# Patient Record
Sex: MALE | Race: WHITE | NOT HISPANIC OR LATINO | Employment: FULL TIME | URBAN - METROPOLITAN AREA
[De-identification: names, ages, dates, MRNs, and addresses within clinical notes are randomized per-mention and may not be internally consistent; named-entity substitution may affect disease eponyms.]

---

## 2017-01-30 ENCOUNTER — HISTORICAL (OUTPATIENT)
Dept: ADMINISTRATIVE | Facility: HOSPITAL | Age: 52
End: 2017-01-30

## 2017-07-07 LAB — CRC RECOMMENDATION EXT: NORMAL

## 2018-07-09 ENCOUNTER — HISTORICAL (OUTPATIENT)
Dept: ADMINISTRATIVE | Facility: HOSPITAL | Age: 53
End: 2018-07-09

## 2021-08-02 ENCOUNTER — HISTORICAL (OUTPATIENT)
Dept: ADMINISTRATIVE | Facility: HOSPITAL | Age: 56
End: 2021-08-02

## 2021-08-02 LAB
ABS NEUT (OLG): 3.65 X10(3)/MCL (ref 2.1–9.2)
BASOPHILS # BLD AUTO: 0 X10(3)/MCL (ref 0–0.2)
BASOPHILS NFR BLD AUTO: 0 %
CRP SERPL HS-MCNC: 1.59 MG/DL
EOSINOPHIL # BLD AUTO: 0.1 X10(3)/MCL (ref 0–0.9)
EOSINOPHIL NFR BLD AUTO: 2 %
ERYTHROCYTE [DISTWIDTH] IN BLOOD BY AUTOMATED COUNT: 13 % (ref 11.5–17)
ERYTHROCYTE [SEDIMENTATION RATE] IN BLOOD: 4 MM/HR (ref 0–15)
HCT VFR BLD AUTO: 44.7 % (ref 42–52)
HGB BLD-MCNC: 13.9 GM/DL (ref 14–18)
LYMPHOCYTES # BLD AUTO: 1.6 X10(3)/MCL (ref 0.6–4.6)
LYMPHOCYTES NFR BLD AUTO: 28 %
MCH RBC QN AUTO: 28 PG (ref 27–31)
MCHC RBC AUTO-ENTMCNC: 31.1 GM/DL (ref 33–36)
MCV RBC AUTO: 90.1 FL (ref 80–94)
MONOCYTES # BLD AUTO: 0.5 X10(3)/MCL (ref 0.1–1.3)
MONOCYTES NFR BLD AUTO: 8 %
NEUTROPHILS # BLD AUTO: 3.65 X10(3)/MCL (ref 2.1–9.2)
NEUTROPHILS NFR BLD AUTO: 61 %
PLATELET # BLD AUTO: 267 X10(3)/MCL (ref 130–400)
PMV BLD AUTO: 10.7 FL (ref 9.4–12.4)
RBC # BLD AUTO: 4.96 X10(6)/MCL (ref 4.7–6.1)
WBC # SPEC AUTO: 6 X10(3)/MCL (ref 4.5–11.5)

## 2021-10-28 ENCOUNTER — HISTORICAL (OUTPATIENT)
Dept: ADMINISTRATIVE | Facility: HOSPITAL | Age: 56
End: 2021-10-28

## 2021-10-28 LAB
ABS NEUT (OLG): 2.83 X10(3)/MCL (ref 2.1–9.2)
ALBUMIN SERPL-MCNC: 3.8 GM/DL (ref 3.5–5)
ALBUMIN/GLOB SERPL: 1.4 RATIO (ref 1.1–2)
ALP SERPL-CCNC: 98 UNIT/L (ref 40–150)
ALT SERPL-CCNC: 22 UNIT/L (ref 0–55)
APPEARANCE, UA: NORMAL
AST SERPL-CCNC: 17 UNIT/L (ref 5–34)
BACTERIA SPEC CULT: NORMAL /HPF
BASOPHILS # BLD AUTO: 0 X10(3)/MCL (ref 0–0.2)
BASOPHILS NFR BLD AUTO: 1 %
BILIRUB SERPL-MCNC: 0.4 MG/DL
BILIRUB UR QL STRIP: NEGATIVE
BILIRUBIN DIRECT+TOT PNL SERPL-MCNC: 0.2 MG/DL (ref 0–0.5)
BILIRUBIN DIRECT+TOT PNL SERPL-MCNC: 0.2 MG/DL (ref 0–0.8)
BUN SERPL-MCNC: 18.2 MG/DL (ref 8.4–25.7)
CALCIUM SERPL-MCNC: 9.1 MG/DL (ref 8.7–10.5)
CHLORIDE SERPL-SCNC: 103 MMOL/L (ref 98–107)
CHOLEST SERPL-MCNC: 158 MG/DL
CHOLEST/HDLC SERPL: 3 {RATIO} (ref 0–5)
CO2 SERPL-SCNC: 28 MMOL/L (ref 22–29)
COLOR UR: YELLOW
CREAT SERPL-MCNC: 0.75 MG/DL (ref 0.73–1.18)
EOSINOPHIL # BLD AUTO: 0.1 X10(3)/MCL (ref 0–0.9)
EOSINOPHIL NFR BLD AUTO: 3 %
ERYTHROCYTE [DISTWIDTH] IN BLOOD BY AUTOMATED COUNT: 14.1 % (ref 11.5–17)
GLOBULIN SER-MCNC: 2.8 GM/DL (ref 2.4–3.5)
GLUCOSE (UA): NEGATIVE
GLUCOSE SERPL-MCNC: 79 MG/DL (ref 74–100)
HCT VFR BLD AUTO: 43.4 % (ref 42–52)
HDLC SERPL-MCNC: 56 MG/DL (ref 35–60)
HGB BLD-MCNC: 13.6 GM/DL (ref 14–18)
HGB UR QL STRIP: NEGATIVE
KETONES UR QL STRIP: NEGATIVE
LDLC SERPL CALC-MCNC: 94 MG/DL (ref 50–140)
LEUKOCYTE ESTERASE UR QL STRIP: NEGATIVE
LYMPHOCYTES # BLD AUTO: 1.2 X10(3)/MCL (ref 0.6–4.6)
LYMPHOCYTES NFR BLD AUTO: 25 %
MCH RBC QN AUTO: 27.4 PG (ref 27–31)
MCHC RBC AUTO-ENTMCNC: 31.3 GM/DL (ref 33–36)
MCV RBC AUTO: 87.5 FL (ref 80–94)
MONOCYTES # BLD AUTO: 0.5 X10(3)/MCL (ref 0.1–1.3)
MONOCYTES NFR BLD AUTO: 10 %
NEUTROPHILS # BLD AUTO: 2.83 X10(3)/MCL (ref 2.1–9.2)
NEUTROPHILS NFR BLD AUTO: 61 %
NITRITE UR QL STRIP: NEGATIVE
PH UR STRIP: 7.5 [PH] (ref 5–9)
PLATELET # BLD AUTO: 236 X10(3)/MCL (ref 130–400)
PMV BLD AUTO: 10.1 FL (ref 9.4–12.4)
POTASSIUM SERPL-SCNC: 4.6 MMOL/L (ref 3.5–5.1)
PROT SERPL-MCNC: 6.6 GM/DL (ref 6.4–8.3)
PROT UR QL STRIP: NEGATIVE
PSA SERPL-MCNC: 1.57 NG/ML
RBC # BLD AUTO: 4.96 X10(6)/MCL (ref 4.7–6.1)
RBC #/AREA URNS HPF: NORMAL /[HPF]
SODIUM SERPL-SCNC: 140 MMOL/L (ref 136–145)
SP GR UR STRIP: 1.02 (ref 1–1.03)
SQUAMOUS EPITHELIAL, UA: NORMAL /HPF (ref 0–4)
TRIGL SERPL-MCNC: 40 MG/DL (ref 34–140)
UROBILINOGEN UR STRIP-ACNC: 0.2
VLDLC SERPL CALC-MCNC: 8 MG/DL
WBC # SPEC AUTO: 4.6 X10(3)/MCL (ref 4.5–11.5)
WBC #/AREA URNS HPF: NORMAL /HPF

## 2022-04-07 ENCOUNTER — HISTORICAL (OUTPATIENT)
Dept: ADMINISTRATIVE | Facility: HOSPITAL | Age: 57
End: 2022-04-07
Payer: COMMERCIAL

## 2022-04-14 ENCOUNTER — HISTORICAL (OUTPATIENT)
Dept: ADMINISTRATIVE | Facility: HOSPITAL | Age: 57
End: 2022-04-14
Payer: COMMERCIAL

## 2022-04-14 LAB
ABS NEUT (OLG): 4.42 (ref 2.1–9.2)
BASOPHILS # BLD AUTO: 0 10*3/UL (ref 0–0.2)
BASOPHILS NFR BLD AUTO: 1 %
EOSINOPHIL # BLD AUTO: 0.1 10*3/UL (ref 0–0.9)
EOSINOPHIL NFR BLD AUTO: 1 %
ERYTHROCYTE [DISTWIDTH] IN BLOOD BY AUTOMATED COUNT: 15.2 % (ref 11.5–17)
HCT VFR BLD AUTO: 43.5 % (ref 42–52)
HGB BLD-MCNC: 13.5 G/DL (ref 14–18)
LYMPHOCYTES # BLD AUTO: 1.5 10*3/UL (ref 0.6–4.6)
LYMPHOCYTES NFR BLD AUTO: 23 %
MANUAL DIFF? (OHS): NO
MCH RBC QN AUTO: 26.8 PG (ref 27–31)
MCHC RBC AUTO-ENTMCNC: 31 G/DL (ref 33–36)
MCV RBC AUTO: 86.3 FL (ref 80–94)
MONOCYTES # BLD AUTO: 0.4 10*3/UL (ref 0.1–1.3)
MONOCYTES NFR BLD AUTO: 7 %
NEUTROPHILS # BLD AUTO: 4.42 10*3/UL (ref 2.1–9.2)
NEUTROPHILS NFR BLD AUTO: 68 %
PLATELET # BLD AUTO: 274 10*3/UL (ref 130–400)
PMV BLD AUTO: 10.3 FL (ref 9.4–12.4)
RBC # BLD AUTO: 5.04 10*6/UL (ref 4.7–6.1)
WBC # SPEC AUTO: 6.5 10*3/UL (ref 4.5–11.5)

## 2022-04-23 VITALS
DIASTOLIC BLOOD PRESSURE: 81 MMHG | HEIGHT: 70 IN | BODY MASS INDEX: 34.3 KG/M2 | OXYGEN SATURATION: 94 % | SYSTOLIC BLOOD PRESSURE: 115 MMHG | WEIGHT: 239.63 LBS

## 2022-08-02 DIAGNOSIS — M05.9 SEROPOSITIVE RHEUMATOID ARTHRITIS: Primary | ICD-10-CM

## 2022-08-02 DIAGNOSIS — M79.10 MYALGIA: ICD-10-CM

## 2022-08-02 RX ORDER — METHOTREXATE 2.5 MG/1
15 TABLET ORAL
Qty: 78 TABLET | Refills: 5 | Status: SHIPPED | OUTPATIENT
Start: 2022-08-02 | End: 2022-09-09 | Stop reason: SDUPTHER

## 2022-08-02 RX ORDER — TIZANIDINE 2 MG/1
2 TABLET ORAL NIGHTLY
Qty: 90 TABLET | Refills: 5 | Status: SHIPPED | OUTPATIENT
Start: 2022-08-02 | End: 2022-09-09 | Stop reason: SDUPTHER

## 2022-09-09 ENCOUNTER — OFFICE VISIT (OUTPATIENT)
Dept: RHEUMATOLOGY | Facility: CLINIC | Age: 57
End: 2022-09-09
Payer: COMMERCIAL

## 2022-09-09 VITALS
SYSTOLIC BLOOD PRESSURE: 144 MMHG | HEART RATE: 72 BPM | WEIGHT: 229 LBS | OXYGEN SATURATION: 97 % | TEMPERATURE: 98 F | HEIGHT: 70 IN | RESPIRATION RATE: 20 BRPM | DIASTOLIC BLOOD PRESSURE: 92 MMHG | BODY MASS INDEX: 32.78 KG/M2

## 2022-09-09 DIAGNOSIS — M05.9 SEROPOSITIVE RHEUMATOID ARTHRITIS: ICD-10-CM

## 2022-09-09 DIAGNOSIS — M79.10 MYALGIA: ICD-10-CM

## 2022-09-09 DIAGNOSIS — M77.10 LATERAL EPICONDYLITIS, UNSPECIFIED LATERALITY: Primary | ICD-10-CM

## 2022-09-09 DIAGNOSIS — M67.919 DISORDER OF ROTATOR CUFF, UNSPECIFIED LATERALITY: ICD-10-CM

## 2022-09-09 PROCEDURE — 1160F RVW MEDS BY RX/DR IN RCRD: CPT | Mod: CPTII,S$GLB,, | Performed by: INTERNAL MEDICINE

## 2022-09-09 PROCEDURE — 1159F MED LIST DOCD IN RCRD: CPT | Mod: CPTII,S$GLB,, | Performed by: INTERNAL MEDICINE

## 2022-09-09 PROCEDURE — 3008F PR BODY MASS INDEX (BMI) DOCUMENTED: ICD-10-PCS | Mod: CPTII,S$GLB,, | Performed by: INTERNAL MEDICINE

## 2022-09-09 PROCEDURE — 99999 PR PBB SHADOW E&M-EST. PATIENT-LVL IV: ICD-10-PCS | Mod: PBBFAC,,, | Performed by: INTERNAL MEDICINE

## 2022-09-09 PROCEDURE — 1159F PR MEDICATION LIST DOCUMENTED IN MEDICAL RECORD: ICD-10-PCS | Mod: CPTII,S$GLB,, | Performed by: INTERNAL MEDICINE

## 2022-09-09 PROCEDURE — 99214 PR OFFICE/OUTPT VISIT, EST, LEVL IV, 30-39 MIN: ICD-10-PCS | Mod: S$GLB,,, | Performed by: INTERNAL MEDICINE

## 2022-09-09 PROCEDURE — 99214 OFFICE O/P EST MOD 30 MIN: CPT | Mod: S$GLB,,, | Performed by: INTERNAL MEDICINE

## 2022-09-09 PROCEDURE — 3008F BODY MASS INDEX DOCD: CPT | Mod: CPTII,S$GLB,, | Performed by: INTERNAL MEDICINE

## 2022-09-09 PROCEDURE — 1160F PR REVIEW ALL MEDS BY PRESCRIBER/CLIN PHARMACIST DOCUMENTED: ICD-10-PCS | Mod: CPTII,S$GLB,, | Performed by: INTERNAL MEDICINE

## 2022-09-09 PROCEDURE — 99999 PR PBB SHADOW E&M-EST. PATIENT-LVL IV: CPT | Mod: PBBFAC,,, | Performed by: INTERNAL MEDICINE

## 2022-09-09 RX ORDER — METHOTREXATE 2.5 MG/1
20 TABLET ORAL
Qty: 100 TABLET | Refills: 3 | Status: SHIPPED | OUTPATIENT
Start: 2022-09-09 | End: 2023-01-20 | Stop reason: SDUPTHER

## 2022-09-09 RX ORDER — HYDROXYCHLOROQUINE SULFATE 200 MG/1
200 TABLET, FILM COATED ORAL 2 TIMES DAILY
COMMUNITY
Start: 2022-09-05 | End: 2022-09-09 | Stop reason: SDUPTHER

## 2022-09-09 RX ORDER — PREDNISONE 5 MG/1
5 TABLET ORAL DAILY PRN
Qty: 90 TABLET | Refills: 3 | Status: SHIPPED | OUTPATIENT
Start: 2022-09-09 | End: 2023-01-20 | Stop reason: SDUPTHER

## 2022-09-09 RX ORDER — SERTRALINE HYDROCHLORIDE 100 MG/1
50 TABLET, FILM COATED ORAL DAILY
COMMUNITY
Start: 2022-08-29 | End: 2022-11-28

## 2022-09-09 RX ORDER — FOLIC ACID 1 MG/1
1000 TABLET ORAL DAILY
COMMUNITY
Start: 2022-08-22 | End: 2022-09-09 | Stop reason: SDUPTHER

## 2022-09-09 RX ORDER — FOLIC ACID 1 MG/1
1000 TABLET ORAL DAILY
Qty: 90 TABLET | Refills: 3 | Status: SHIPPED | OUTPATIENT
Start: 2022-09-09 | End: 2023-01-20 | Stop reason: SDUPTHER

## 2022-09-09 RX ORDER — HYDROXYCHLOROQUINE SULFATE 200 MG/1
200 TABLET, FILM COATED ORAL 2 TIMES DAILY
Qty: 180 TABLET | Refills: 3 | Status: SHIPPED | OUTPATIENT
Start: 2022-09-09 | End: 2023-01-20 | Stop reason: SDUPTHER

## 2022-09-09 RX ORDER — TIZANIDINE 2 MG/1
2 TABLET ORAL NIGHTLY
Qty: 90 TABLET | Refills: 5 | Status: SHIPPED | OUTPATIENT
Start: 2022-09-09 | End: 2023-01-20 | Stop reason: SDUPTHER

## 2022-09-09 NOTE — PROGRESS NOTES
"Subjective:       Patient ID: Truman Gonzalez is a 56 y.o. male.    Chief Complaint: follow up (RIGHT SHOULDER AND PAIN IN BOTH HANDS)    The patient is complaining of joint pain involving the MCP PIP wrist elbow shoulders hips knees and ankles bilaterally.  The pain is 9/10 in intensity dull in quality and continuous.  That is associated with a morning stiffness lasting for more than 60 minutes.  Is also having difficulty maintaining a good night of sleep.  This has been associated with myalgias.  Muscle aches are 9/10 in intensity dull in quality and continuous.  They are associated with fatigue.  No fever no chills no others.      Review of Systems   Constitutional:  Negative for appetite change, chills and fever.   HENT:  Negative for congestion, ear pain, mouth sores, nosebleeds and trouble swallowing.    Eyes:  Negative for photophobia and discharge.   Respiratory:  Negative for chest tightness and shortness of breath.    Cardiovascular:  Negative for chest pain.   Gastrointestinal:  Negative for abdominal pain and vomiting.   Endocrine: Negative.    Genitourinary:  Negative for hematuria.   Musculoskeletal:         As per HPI   Skin:  Negative for rash.   Neurological:  Negative for weakness.       Objective:   BP (!) 144/92 (BP Location: Left arm, Patient Position: Sitting, BP Method: Large (Automatic))   Pulse 72   Temp 97.7 °F (36.5 °C) (Temporal)   Resp 20   Ht 5' 10" (1.778 m)   Wt 103.9 kg (229 lb)   SpO2 97%   BMI 32.86 kg/m²      Physical Exam   Constitutional: He is oriented to person, place, and time. He appears well-developed and well-nourished. No distress.   HENT:   Head: Normocephalic and atraumatic.   Right Ear: External ear normal.   Left Ear: External ear normal.   Eyes: Pupils are equal, round, and reactive to light.   Cardiovascular: Normal rate, regular rhythm and normal heart sounds.   Pulmonary/Chest: Breath sounds normal.   Abdominal: Soft. There is no abdominal tenderness. "   Musculoskeletal:      Right shoulder: Tenderness present.      Left shoulder: Tenderness present.      Right elbow: Tenderness present.      Left elbow: Tenderness present.      Right wrist: Tenderness present.      Left wrist: Tenderness present.      Cervical back: Neck supple.      Right hip: Tenderness present.      Left hip: Tenderness present.      Right knee: Tenderness present.      Left knee: Tenderness present.      Right ankle: Tenderness present.      Left ankle: Tenderness present.   Lymphadenopathy:     He has no cervical adenopathy.   Neurological: He is alert and oriented to person, place, and time. He displays normal reflexes. No cranial nerve deficit or sensory deficit. He exhibits normal muscle tone. Coordination normal.   Skin: No rash noted. No erythema.   Vitals reviewed.      Right Side Rheumatological Exam     The patient is tender to palpation of the shoulder, elbow, wrist, knee, 1st PIP, 1st MCP, 2nd PIP, 2nd MCP, 3rd PIP, 3rd MCP, 4th PIP, 4th MCP, 5th PIP, hip, ankle, 1st MTP, 2nd MTP, 3rd MTP, 4th MTP, 5th MTP, 1st toe IP, 2nd toe IP, 3rd toe IP, 4th toe IP and 5th toe IP    Left Side Rheumatological Exam     The patient is tender to palpation of the shoulder, elbow, wrist, knee, 1st PIP, 1st MCP, 2nd PIP, 2nd MCP, 3rd PIP, 3rd MCP, 4th PIP, 4th MCP, 5th PIP, 5th MCP, hip, ankle, 1st MTP, 2nd MTP, 3rd MTP, 4th MTP, 5th MTP, 1st toe IP, 2nd toe IP, 3rd toe IP, 4th toe IP and 5th toe IP.       Completed Fibromyalgia exam 18/18 tender points.  No data to display     Assessment:       1. Lateral epicondylitis, unspecified laterality    2. Seropositive rheumatoid arthritis    3. Myalgia    4. Disorder of rotator cuff, unspecified laterality              Plan:       Problem List Items Addressed This Visit          Immunology/Multi System    Seropositive rheumatoid arthritis    Relevant Medications    folic acid (FOLVITE) 1 MG tablet    hydrOXYchloroQUINE (PLAQUENIL) 200 mg tablet     methotrexate 2.5 MG Tab    tiZANidine (ZANAFLEX) 2 MG tablet    predniSONE (DELTASONE) 5 MG tablet    Other Relevant Orders    CBC Auto Differential    Comprehensive Metabolic Panel    CRP, High Sensitivity    Vitamin D    Urinalysis    Antinuclear Antibody (JAMES), HEp-2, IgG    Quantiferon Gold TB    Rheumatoid Quantitative       Orthopedic    Disorder of rotator cuff    Relevant Medications    folic acid (FOLVITE) 1 MG tablet    hydrOXYchloroQUINE (PLAQUENIL) 200 mg tablet    methotrexate 2.5 MG Tab    tiZANidine (ZANAFLEX) 2 MG tablet    predniSONE (DELTASONE) 5 MG tablet    Other Relevant Orders    CBC Auto Differential    Comprehensive Metabolic Panel    CRP, High Sensitivity    Vitamin D    Urinalysis    Antinuclear Antibody (JAMES), HEp-2, IgG    Quantiferon Gold TB    Rheumatoid Quantitative    Lateral epicondylitis - Primary    Relevant Medications    folic acid (FOLVITE) 1 MG tablet    hydrOXYchloroQUINE (PLAQUENIL) 200 mg tablet    methotrexate 2.5 MG Tab    tiZANidine (ZANAFLEX) 2 MG tablet    predniSONE (DELTASONE) 5 MG tablet    Other Relevant Orders    CBC Auto Differential    Comprehensive Metabolic Panel    CRP, High Sensitivity    Vitamin D    Urinalysis    Antinuclear Antibody (JAMES), HEp-2, IgG    Quantiferon Gold TB    Rheumatoid Quantitative     Other Visit Diagnoses       Myalgia        Relevant Medications    folic acid (FOLVITE) 1 MG tablet    hydrOXYchloroQUINE (PLAQUENIL) 200 mg tablet    methotrexate 2.5 MG Tab    tiZANidine (ZANAFLEX) 2 MG tablet    predniSONE (DELTASONE) 5 MG tablet    Other Relevant Orders    CBC Auto Differential    Comprehensive Metabolic Panel    CRP, High Sensitivity    Vitamin D    Urinalysis    Antinuclear Antibody (JAMES), HEp-2, IgG    Quantiferon Gold TB    Rheumatoid Quantitative

## 2022-09-13 RX ORDER — TRIAMCINOLONE ACETONIDE 40 MG/ML
80 INJECTION, SUSPENSION INTRA-ARTICULAR; INTRAMUSCULAR
Status: DISCONTINUED | OUTPATIENT
Start: 2022-09-13 | End: 2023-01-20

## 2022-10-31 ENCOUNTER — LAB VISIT (OUTPATIENT)
Dept: LAB | Facility: HOSPITAL | Age: 57
End: 2022-10-31
Payer: COMMERCIAL

## 2022-10-31 ENCOUNTER — TELEPHONE (OUTPATIENT)
Dept: INTERNAL MEDICINE | Facility: CLINIC | Age: 57
End: 2022-10-31
Payer: COMMERCIAL

## 2022-10-31 DIAGNOSIS — M75.101 ROTATOR CUFF SYNDROME OF RIGHT SHOULDER: ICD-10-CM

## 2022-10-31 DIAGNOSIS — M05.9 JUVENILE SEROPOSITIVE ARTHRITIS: ICD-10-CM

## 2022-10-31 DIAGNOSIS — F41.9 ANXIETY HYPERVENTILATION: ICD-10-CM

## 2022-10-31 DIAGNOSIS — R19.7 DIARRHEA, UNSPECIFIED TYPE: Primary | ICD-10-CM

## 2022-10-31 DIAGNOSIS — Z00.00 ROUTINE GENERAL MEDICAL EXAMINATION AT A HEALTH CARE FACILITY: Primary | ICD-10-CM

## 2022-10-31 DIAGNOSIS — M17.12 DEGENERATIVE ARTHRITIS OF LEFT KNEE: ICD-10-CM

## 2022-10-31 DIAGNOSIS — F45.8 ANXIETY HYPERVENTILATION: ICD-10-CM

## 2022-10-31 LAB
ALBUMIN SERPL-MCNC: 4 GM/DL (ref 3.5–5)
ALBUMIN/GLOB SERPL: 1.7 RATIO (ref 1.1–2)
ALP SERPL-CCNC: 88 UNIT/L (ref 40–150)
ALT SERPL-CCNC: 51 UNIT/L (ref 0–55)
APPEARANCE UR: CLEAR
AST SERPL-CCNC: 29 UNIT/L (ref 5–34)
BACTERIA #/AREA URNS AUTO: NORMAL /HPF
BILIRUB UR QL STRIP.AUTO: NEGATIVE MG/DL
BILIRUBIN DIRECT+TOT PNL SERPL-MCNC: 0.5 MG/DL
BUN SERPL-MCNC: 13.4 MG/DL (ref 8.4–25.7)
CALCIUM SERPL-MCNC: 9.4 MG/DL (ref 8.4–10.2)
CHLORIDE SERPL-SCNC: 108 MMOL/L (ref 98–107)
CHOLEST SERPL-MCNC: 134 MG/DL
CHOLEST/HDLC SERPL: 3 {RATIO} (ref 0–5)
CO2 SERPL-SCNC: 28 MMOL/L (ref 22–29)
COLOR UR AUTO: YELLOW
CREAT SERPL-MCNC: 0.69 MG/DL (ref 0.73–1.18)
ERYTHROCYTE [DISTWIDTH] IN BLOOD BY AUTOMATED COUNT: 14.1 % (ref 11.5–17)
EST. AVERAGE GLUCOSE BLD GHB EST-MCNC: 108.3 MG/DL
GFR SERPLBLD CREATININE-BSD FMLA CKD-EPI: >60 MLS/MIN/1.73/M2
GLOBULIN SER-MCNC: 2.3 GM/DL (ref 2.4–3.5)
GLUCOSE SERPL-MCNC: 90 MG/DL (ref 74–100)
GLUCOSE UR QL STRIP.AUTO: NEGATIVE MG/DL
HBA1C MFR BLD: 5.4 %
HCT VFR BLD AUTO: 44.2 % (ref 42–52)
HDLC SERPL-MCNC: 51 MG/DL (ref 35–60)
HGB BLD-MCNC: 14.4 GM/DL (ref 14–18)
KETONES UR QL STRIP.AUTO: NEGATIVE MG/DL
LDLC SERPL CALC-MCNC: 75 MG/DL (ref 50–140)
LEUKOCYTE ESTERASE UR QL STRIP.AUTO: NEGATIVE UNIT/L
MCH RBC QN AUTO: 29.8 PG (ref 27–31)
MCHC RBC AUTO-ENTMCNC: 32.6 MG/DL (ref 33–36)
MCV RBC AUTO: 91.5 FL (ref 80–94)
NITRITE UR QL STRIP.AUTO: NEGATIVE
NRBC BLD AUTO-RTO: 0 %
PH UR STRIP.AUTO: 5 [PH]
PLATELET # BLD AUTO: 262 X10(3)/MCL (ref 130–400)
PMV BLD AUTO: 10.1 FL (ref 7.4–10.4)
POTASSIUM SERPL-SCNC: 4.2 MMOL/L (ref 3.5–5.1)
PROT SERPL-MCNC: 6.3 GM/DL (ref 6.4–8.3)
PROT UR QL STRIP.AUTO: NEGATIVE MG/DL
PSA SERPL-MCNC: 1.01 NG/ML
RBC # BLD AUTO: 4.83 X10(6)/MCL (ref 4.7–6.1)
RBC #/AREA URNS AUTO: <5 /HPF
RBC UR QL AUTO: NEGATIVE UNIT/L
SODIUM SERPL-SCNC: 142 MMOL/L (ref 136–145)
SP GR UR STRIP.AUTO: 1.02 (ref 1–1.03)
SQUAMOUS #/AREA URNS AUTO: <5 /HPF
TRIGL SERPL-MCNC: 38 MG/DL (ref 34–140)
TSH SERPL-ACNC: 1.18 UIU/ML (ref 0.35–4.94)
UROBILINOGEN UR STRIP-ACNC: 0.2 MG/DL
VLDLC SERPL CALC-MCNC: 8 MG/DL
WBC # SPEC AUTO: 5.9 X10(3)/MCL (ref 4.5–11.5)
WBC #/AREA URNS AUTO: <5 /HPF

## 2022-10-31 PROCEDURE — 84443 ASSAY THYROID STIM HORMONE: CPT

## 2022-10-31 PROCEDURE — 81001 URINALYSIS AUTO W/SCOPE: CPT

## 2022-10-31 PROCEDURE — 85027 COMPLETE CBC AUTOMATED: CPT

## 2022-10-31 PROCEDURE — 83036 HEMOGLOBIN GLYCOSYLATED A1C: CPT

## 2022-10-31 PROCEDURE — 36415 COLL VENOUS BLD VENIPUNCTURE: CPT

## 2022-10-31 PROCEDURE — 80061 LIPID PANEL: CPT

## 2022-10-31 PROCEDURE — 84153 ASSAY OF PSA TOTAL: CPT

## 2022-10-31 PROCEDURE — 80053 COMPREHEN METABOLIC PANEL: CPT

## 2022-10-31 NOTE — TELEPHONE ENCOUNTER
Call back to Truman   For the past 2 weeks he has been having some GI issues stomach upset on and off diarrhea with some dark stools  He is going to appointment next week to see me with lab demanding stool evaluation to be done today.

## 2022-11-03 LAB
CLOSTRIDIUM DIFFICILE GDH ANTIGEN (OHS): NEGATIVE
CLOSTRIDIUM DIFFICILE TOXIN A/B (OHS): NEGATIVE
CRYPTOSP AG SPEC QL: NEGATIVE
G LAMBLIA AG STL QL IA: NEGATIVE
GIARDIA/CRYPTO NEG CONT (OHS): NEGATIVE
GIARDIA/CRYPTO POS CONT (OHS): POSITIVE

## 2022-11-04 ENCOUNTER — TELEPHONE (OUTPATIENT)
Dept: INTERNAL MEDICINE | Facility: CLINIC | Age: 57
End: 2022-11-04
Payer: COMMERCIAL

## 2022-11-04 LAB — BACTERIA STL CULT: NORMAL

## 2022-11-04 NOTE — TELEPHONE ENCOUNTER
Call back to Truman with results of his stool all are negative   For some reason the lab cancel the FIT test for blood   He is doing much better no more diarrhea  His office visit next week will follow-up on.

## 2022-11-05 LAB — CALPROTECTIN STL-MCNT: <50 MCG/G

## 2022-11-10 ENCOUNTER — OFFICE VISIT (OUTPATIENT)
Dept: INTERNAL MEDICINE | Facility: CLINIC | Age: 57
End: 2022-11-10
Payer: COMMERCIAL

## 2022-11-10 VITALS
HEIGHT: 70 IN | WEIGHT: 221.19 LBS | DIASTOLIC BLOOD PRESSURE: 78 MMHG | BODY MASS INDEX: 31.67 KG/M2 | OXYGEN SATURATION: 98 % | SYSTOLIC BLOOD PRESSURE: 128 MMHG | HEART RATE: 90 BPM

## 2022-11-10 DIAGNOSIS — Z23 FLU VACCINE NEED: ICD-10-CM

## 2022-11-10 DIAGNOSIS — Z00.00 WELLNESS EXAMINATION: Primary | ICD-10-CM

## 2022-11-10 PROCEDURE — 3008F PR BODY MASS INDEX (BMI) DOCUMENTED: ICD-10-PCS | Mod: CPTII,,, | Performed by: INTERNAL MEDICINE

## 2022-11-10 PROCEDURE — 90471 IMMUNIZATION ADMIN: CPT | Mod: ,,, | Performed by: INTERNAL MEDICINE

## 2022-11-10 PROCEDURE — 1159F MED LIST DOCD IN RCRD: CPT | Mod: CPTII,,, | Performed by: INTERNAL MEDICINE

## 2022-11-10 PROCEDURE — 3078F PR MOST RECENT DIASTOLIC BLOOD PRESSURE < 80 MM HG: ICD-10-PCS | Mod: CPTII,,, | Performed by: INTERNAL MEDICINE

## 2022-11-10 PROCEDURE — 3078F DIAST BP <80 MM HG: CPT | Mod: CPTII,,, | Performed by: INTERNAL MEDICINE

## 2022-11-10 PROCEDURE — 90686 IIV4 VACC NO PRSV 0.5 ML IM: CPT | Mod: ,,, | Performed by: INTERNAL MEDICINE

## 2022-11-10 PROCEDURE — 90471 FLU VACCINE (QUAD) GREATER THAN OR EQUAL TO 3YO PRESERVATIVE FREE IM: ICD-10-PCS | Mod: ,,, | Performed by: INTERNAL MEDICINE

## 2022-11-10 PROCEDURE — 3074F SYST BP LT 130 MM HG: CPT | Mod: CPTII,,, | Performed by: INTERNAL MEDICINE

## 2022-11-10 PROCEDURE — 99396 PR PREVENTIVE VISIT,EST,40-64: ICD-10-PCS | Mod: 25,,, | Performed by: INTERNAL MEDICINE

## 2022-11-10 PROCEDURE — 99396 PREV VISIT EST AGE 40-64: CPT | Mod: 25,,, | Performed by: INTERNAL MEDICINE

## 2022-11-10 PROCEDURE — 3074F PR MOST RECENT SYSTOLIC BLOOD PRESSURE < 130 MM HG: ICD-10-PCS | Mod: CPTII,,, | Performed by: INTERNAL MEDICINE

## 2022-11-10 PROCEDURE — 3008F BODY MASS INDEX DOCD: CPT | Mod: CPTII,,, | Performed by: INTERNAL MEDICINE

## 2022-11-10 PROCEDURE — 90686 FLU VACCINE (QUAD) GREATER THAN OR EQUAL TO 3YO PRESERVATIVE FREE IM: ICD-10-PCS | Mod: ,,, | Performed by: INTERNAL MEDICINE

## 2022-11-10 PROCEDURE — 1159F PR MEDICATION LIST DOCUMENTED IN MEDICAL RECORD: ICD-10-PCS | Mod: CPTII,,, | Performed by: INTERNAL MEDICINE

## 2022-11-10 PROCEDURE — 1160F RVW MEDS BY RX/DR IN RCRD: CPT | Mod: CPTII,,, | Performed by: INTERNAL MEDICINE

## 2022-11-10 PROCEDURE — 1160F PR REVIEW ALL MEDS BY PRESCRIBER/CLIN PHARMACIST DOCUMENTED: ICD-10-PCS | Mod: CPTII,,, | Performed by: INTERNAL MEDICINE

## 2022-11-10 RX ORDER — ALPRAZOLAM 0.25 MG/1
0.25 TABLET ORAL NIGHTLY PRN
COMMUNITY

## 2022-11-10 RX ORDER — OMEPRAZOLE 40 MG/1
40 CAPSULE, DELAYED RELEASE ORAL DAILY
COMMUNITY
Start: 2022-01-20 | End: 2022-11-10 | Stop reason: ALTCHOICE

## 2022-11-10 RX ORDER — AMOXICILLIN 500 MG
1 CAPSULE ORAL DAILY
COMMUNITY

## 2022-11-10 RX ORDER — DICLOFENAC SODIUM 10 MG/G
1 GEL TOPICAL 3 TIMES DAILY PRN
COMMUNITY
Start: 2021-10-28 | End: 2023-01-20 | Stop reason: SDUPTHER

## 2022-11-10 RX ORDER — TRAMADOL HYDROCHLORIDE 50 MG/1
50 TABLET ORAL DAILY PRN
COMMUNITY
Start: 2022-01-20

## 2022-11-10 NOTE — PROGRESS NOTES
Maynor Wilson MD        PATIENT NAME: Truman Gonzalez  : 1965  DATE: 11/10/22  MRN: [id number]      Billing Provider: Maynor Wilson MD  Level of Service: UT PREVENTIVE VISIT,EST,40-64  Patient PCP Information       Provider PCP Type    Maynor Wilson MD General            Reason for Visit / Chief Complaint: Annual Exam (Wellness )       Update PCP  Update Chief Complaint         History of Present Illness / Problem Focused Workflow     Truman Gonzalez presents to the clinic with Annual Exam (Wellness )      Truman is here for his annual wellness   A few weeks ago he had a GI illness with diarrhea that subsided  He is successfully lost 30 lb and feeling good   Rheumatoid ar      Review of Systems   Review of Systems   Constitutional: Negative.    HENT: Negative.     Eyes: Negative.    Respiratory: Negative.     Cardiovascular: Negative.    Gastrointestinal: Negative.    Endocrine: Negative.    Genitourinary: Negative.    Musculoskeletal: Negative.    Integumentary:  Negative.   Neurological: Negative.    Psychiatric/Behavioral: Negative.        Medical / Social / Family History   History reviewed. No pertinent past medical history.    Past Surgical History:   Procedure Laterality Date    SPINE SURGERY  2018       Social History  Mr. Gonzalez  reports that he has never smoked. He has never been exposed to tobacco smoke. He has never used smokeless tobacco. He reports current alcohol use of about 10.0 standard drinks per week. He reports that he does not use drugs.    Family History  Mr.'s Gonzalez  family history includes Atrial fibrillation in his mother; Colon cancer in his paternal grandfather.    Medications and Allergies     Medications  Outpatient Medications Marked as Taking for the 11/10/22 encounter (Office Visit) with Maynor Wilson MD   Medication Sig Dispense Refill    ALPRAZolam (XANAX) 0.25 MG tablet Take 0.25 mg by mouth nightly as needed for Anxiety.      DICLOFENAC  POTASSIUM ORAL Take 75 mg by mouth daily as needed.      diclofenac sodium (VOLTAREN) 1 % Gel Apply 1 g topically 3 (three) times daily as needed.      folic acid (FOLVITE) 1 MG tablet Take 1 tablet (1,000 mcg total) by mouth once daily. 90 tablet 3    hydrOXYchloroQUINE (PLAQUENIL) 200 mg tablet Take 1 tablet (200 mg total) by mouth 2 (two) times daily. 180 tablet 3    methotrexate 2.5 MG Tab Take 8 tablets (20 mg total) by mouth every 7 days. Every Thursday take 4 tab at lunch and 4 tab at supper 100 tablet 3    omega-3 fatty acids/fish oil (FISH OIL-OMEGA-3 FATTY ACIDS) 300-1,000 mg capsule Take 1 capsule by mouth once daily.      predniSONE (DELTASONE) 5 MG tablet Take 1 tablet (5 mg total) by mouth daily as needed (as needed for flare ups for  three consecutives days). AFTER BREAKFAST 90 tablet 3    sertraline (ZOLOFT) 100 MG tablet Take 50 mg by mouth once daily.      tiZANidine (ZANAFLEX) 2 MG tablet Take 1 tablet (2 mg total) by mouth nightly. 90 tablet 5    traMADoL (ULTRAM) 50 mg tablet Take 50 mg by mouth daily as needed.      [DISCONTINUED] omeprazole (PRILOSEC) 40 MG capsule Take 40 mg by mouth once daily.       Current Facility-Administered Medications for the 11/10/22 encounter (Office Visit) with Maynor Wilson MD   Medication Dose Route Frequency Provider Last Rate Last Admin    triamcinolone acetonide injection 80 mg  80 mg Intramuscular 1 time in Clinic/HOD Melo Cedillo MD           Allergies  Review of patient's allergies indicates:  No Known Allergies    Physical Examination     Vitals:    11/10/22 1511   BP: 128/78   Pulse: 90     Physical Exam  Vitals reviewed.   Constitutional:       Appearance: Normal appearance.   HENT:      Head: Normocephalic.      Right Ear: Tympanic membrane normal.      Left Ear: Tympanic membrane normal.      Nose: Nose normal.      Mouth/Throat:      Pharynx: Oropharynx is clear.   Eyes:      Extraocular Movements: Extraocular movements intact.       Pupils: Pupils are equal, round, and reactive to light.   Cardiovascular:      Rate and Rhythm: Normal rate and regular rhythm.      Pulses: Normal pulses.      Heart sounds: Normal heart sounds.   Pulmonary:      Effort: Pulmonary effort is normal.      Breath sounds: Normal breath sounds.   Abdominal:      General: Abdomen is flat. Bowel sounds are normal.      Palpations: Abdomen is soft.   Genitourinary:     Testes: Normal.      Prostate: Normal.      Rectum: Normal.   Musculoskeletal:         General: Normal range of motion.      Cervical back: Normal range of motion.   Skin:     General: Skin is warm and dry.   Neurological:      General: No focal deficit present.      Mental Status: He is alert and oriented to person, place, and time.   Psychiatric:         Mood and Affect: Mood normal.         Behavior: Behavior normal.        Assessment and Plan (including Health Maintenance)      Problem List  Smart Sets  Document Outside HM   :    Plan:   Wellness examination       Discussion all laboratory normal   Continue medications   Flu vaccine today new line revisit 1 year annual wellness.           Health Maintenance Due   Topic Date Due    Hepatitis C Screening  Never done    Pneumococcal Vaccines (Age 0-64) (1 - PCV) Never done    HIV Screening  Never done    Shingles Vaccine (1 of 2) Never done    COVID-19 Vaccine (3 - Booster) 06/08/2021    Influenza Vaccine (1) 09/01/2022       Problem List Items Addressed This Visit    None  Visit Diagnoses       Wellness examination    -  Primary            Health Maintenance Topics with due status: Not Due       Topic Last Completion Date    Colorectal Cancer Screening 07/07/2017    TETANUS VACCINE 09/19/2022    Lipid Panel 10/31/2022       Future Appointments   Date Time Provider Department Center   1/20/2023  8:00 AM Melo Cedillo MD [facility]            Signature:  Maynor Marie MD  [facility]  [address] [place] [address]  [address]  [place] [id number]    Date of encounter: 11/10/22

## 2022-12-09 ENCOUNTER — DOCUMENTATION ONLY (OUTPATIENT)
Dept: ADMINISTRATIVE | Facility: HOSPITAL | Age: 57
End: 2022-12-09
Payer: COMMERCIAL

## 2023-01-20 ENCOUNTER — OFFICE VISIT (OUTPATIENT)
Dept: RHEUMATOLOGY | Facility: CLINIC | Age: 58
End: 2023-01-20
Payer: COMMERCIAL

## 2023-01-20 VITALS
SYSTOLIC BLOOD PRESSURE: 135 MMHG | DIASTOLIC BLOOD PRESSURE: 88 MMHG | OXYGEN SATURATION: 98 % | HEIGHT: 70 IN | WEIGHT: 229.81 LBS | TEMPERATURE: 99 F | BODY MASS INDEX: 32.9 KG/M2 | HEART RATE: 62 BPM

## 2023-01-20 DIAGNOSIS — M05.9 SEROPOSITIVE RHEUMATOID ARTHRITIS: Primary | ICD-10-CM

## 2023-01-20 DIAGNOSIS — M67.919 DISORDER OF ROTATOR CUFF, UNSPECIFIED LATERALITY: ICD-10-CM

## 2023-01-20 DIAGNOSIS — M79.10 MYALGIA: ICD-10-CM

## 2023-01-20 DIAGNOSIS — M77.10 LATERAL EPICONDYLITIS, UNSPECIFIED LATERALITY: ICD-10-CM

## 2023-01-20 PROBLEM — F41.1 ANXIETY STATE: Status: ACTIVE | Noted: 2023-01-20

## 2023-01-20 PROCEDURE — 3008F BODY MASS INDEX DOCD: CPT | Mod: CPTII,S$GLB,, | Performed by: INTERNAL MEDICINE

## 2023-01-20 PROCEDURE — 3079F PR MOST RECENT DIASTOLIC BLOOD PRESSURE 80-89 MM HG: ICD-10-PCS | Mod: CPTII,S$GLB,, | Performed by: INTERNAL MEDICINE

## 2023-01-20 PROCEDURE — 3075F SYST BP GE 130 - 139MM HG: CPT | Mod: CPTII,S$GLB,, | Performed by: INTERNAL MEDICINE

## 2023-01-20 PROCEDURE — 99214 OFFICE O/P EST MOD 30 MIN: CPT | Mod: S$GLB,,, | Performed by: INTERNAL MEDICINE

## 2023-01-20 PROCEDURE — 99999 PR PBB SHADOW E&M-EST. PATIENT-LVL III: CPT | Mod: PBBFAC,,, | Performed by: INTERNAL MEDICINE

## 2023-01-20 PROCEDURE — 1159F MED LIST DOCD IN RCRD: CPT | Mod: CPTII,S$GLB,, | Performed by: INTERNAL MEDICINE

## 2023-01-20 PROCEDURE — 3075F PR MOST RECENT SYSTOLIC BLOOD PRESS GE 130-139MM HG: ICD-10-PCS | Mod: CPTII,S$GLB,, | Performed by: INTERNAL MEDICINE

## 2023-01-20 PROCEDURE — 3008F PR BODY MASS INDEX (BMI) DOCUMENTED: ICD-10-PCS | Mod: CPTII,S$GLB,, | Performed by: INTERNAL MEDICINE

## 2023-01-20 PROCEDURE — 1159F PR MEDICATION LIST DOCUMENTED IN MEDICAL RECORD: ICD-10-PCS | Mod: CPTII,S$GLB,, | Performed by: INTERNAL MEDICINE

## 2023-01-20 PROCEDURE — 99999 PR PBB SHADOW E&M-EST. PATIENT-LVL III: ICD-10-PCS | Mod: PBBFAC,,, | Performed by: INTERNAL MEDICINE

## 2023-01-20 PROCEDURE — 3079F DIAST BP 80-89 MM HG: CPT | Mod: CPTII,S$GLB,, | Performed by: INTERNAL MEDICINE

## 2023-01-20 PROCEDURE — 99214 PR OFFICE/OUTPT VISIT, EST, LEVL IV, 30-39 MIN: ICD-10-PCS | Mod: S$GLB,,, | Performed by: INTERNAL MEDICINE

## 2023-01-20 RX ORDER — PREDNISONE 5 MG/1
5 TABLET ORAL DAILY PRN
Qty: 90 TABLET | Refills: 3 | Status: SHIPPED | OUTPATIENT
Start: 2023-01-20 | End: 2023-05-22 | Stop reason: SDUPTHER

## 2023-01-20 RX ORDER — TIZANIDINE 4 MG/1
4 TABLET ORAL NIGHTLY
Qty: 90 TABLET | Refills: 3 | Status: SHIPPED | OUTPATIENT
Start: 2023-01-20 | End: 2023-05-22 | Stop reason: SDUPTHER

## 2023-01-20 RX ORDER — HYDROXYCHLOROQUINE SULFATE 200 MG/1
200 TABLET, FILM COATED ORAL 2 TIMES DAILY
Qty: 180 TABLET | Refills: 3 | Status: SHIPPED | OUTPATIENT
Start: 2023-01-20 | End: 2023-05-22 | Stop reason: SDUPTHER

## 2023-01-20 RX ORDER — METHOTREXATE 2.5 MG/1
20 TABLET ORAL
Qty: 100 TABLET | Refills: 3 | Status: SHIPPED | OUTPATIENT
Start: 2023-01-20 | End: 2023-05-22 | Stop reason: SDUPTHER

## 2023-01-20 RX ORDER — FOLIC ACID 1 MG/1
1000 TABLET ORAL DAILY
Qty: 90 TABLET | Refills: 3 | Status: SHIPPED | OUTPATIENT
Start: 2023-01-20 | End: 2023-05-22 | Stop reason: SDUPTHER

## 2023-01-20 RX ORDER — DICLOFENAC SODIUM 10 MG/G
2 GEL TOPICAL 3 TIMES DAILY PRN
Qty: 100 G | Refills: 5 | Status: SHIPPED | OUTPATIENT
Start: 2023-01-20

## 2023-01-20 NOTE — PROGRESS NOTES
"Subjective:       Patient ID: Truman Gonzalez is a 57 y.o. male.    Chief Complaint: Follow-up (Follow up. No complaints)    The patient is complaining of joint pain involving the MCP PIP wrist elbow shoulders hips knees and ankles bilaterally.  The pain is 2/10 in intensity dull in quality and continuous.  That is associated with a morning stiffness lasting for more than 60 minutes.  Is also having difficulty maintaining a good night of sleep.  This has been associated with myalgias.  Muscle aches are 6/10 in intensity dull in quality and continuous.  They are associated with fatigue.  No fever no chills no others.  I will increase the dose of tizanidine to 4 mg q hs     Review of Systems   Constitutional:  Negative for appetite change, chills and fever.   HENT:  Negative for congestion, ear pain, mouth sores, nosebleeds and trouble swallowing.    Eyes:  Negative for photophobia and discharge.   Respiratory:  Negative for chest tightness and shortness of breath.    Cardiovascular:  Negative for chest pain.   Gastrointestinal:  Negative for abdominal pain and vomiting.   Endocrine: Negative.    Genitourinary:  Negative for hematuria.   Musculoskeletal:         As per HPI   Skin:  Negative for rash.   Neurological:  Negative for weakness.       Objective:   /88 (BP Location: Left arm, Patient Position: Sitting, BP Method: Medium (Automatic))   Pulse 62   Temp 98.7 °F (37.1 °C) (Oral)   Ht 5' 10" (1.778 m)   Wt 104.2 kg (229 lb 12.8 oz)   SpO2 98%   BMI 32.97 kg/m²      Physical Exam   Constitutional: He is oriented to person, place, and time. He appears well-developed and well-nourished. No distress.   HENT:   Head: Normocephalic and atraumatic.   Right Ear: External ear normal.   Left Ear: External ear normal.   Eyes: Pupils are equal, round, and reactive to light.   Cardiovascular: Normal rate, regular rhythm and normal heart sounds.   Pulmonary/Chest: Breath sounds normal.   Abdominal: Soft. There " is no abdominal tenderness.   Musculoskeletal:      Right shoulder: Normal.      Left shoulder: Normal.      Right elbow: Normal.      Left elbow: Normal.      Right wrist: Normal.      Left wrist: Normal.      Cervical back: Neck supple.      Right hip: Normal.      Left hip: Normal.      Right knee: Normal.      Left knee: Normal.   Lymphadenopathy:     He has no cervical adenopathy.   Neurological: He is alert and oriented to person, place, and time. He displays normal reflexes. No cranial nerve deficit or sensory deficit. He exhibits normal muscle tone. Coordination normal.   Skin: No rash noted. No erythema.   Vitals reviewed.      Right Side Rheumatological Exam     Examination finds the shoulder, elbow, wrist, knee, 1st PIP, 1st MCP, 2nd PIP, 2nd MCP, 3rd PIP, 3rd MCP, 4th PIP, 4th MCP, 5th PIP, 5th MCP, temporomandibular, hip, ankle, 1st MTP, 2nd MTP, 3rd MTP, 4th MTP and 5th MTP normal.    Left Side Rheumatological Exam     Examination finds the shoulder, elbow, wrist, knee, 1st PIP, 1st MCP, 2nd PIP, 2nd MCP, 3rd PIP, 3rd MCP, 4th PIP, 4th MCP, 5th PIP, 5th MCP, temporomandibular, hip, ankle, 1st MTP, 2nd MTP, 3rd MTP, 4th MTP and 5th MTP normal.       Completed Fibromyalgia exam 11/18 tender points.  No data to display     Assessment:       1. Seropositive rheumatoid arthritis    2. Myalgia    3. Lateral epicondylitis, unspecified laterality    4. Disorder of rotator cuff, unspecified laterality            Medication List with Changes/Refills   Current Medications    ALPRAZOLAM (XANAX) 0.25 MG TABLET    Take 0.25 mg by mouth nightly as needed for Anxiety.       Start Date: --        End Date: --    OMEGA-3 FATTY ACIDS/FISH OIL (FISH OIL-OMEGA-3 FATTY ACIDS) 300-1,000 MG CAPSULE    Take 1 capsule by mouth once daily.       Start Date: --        End Date: --    SERTRALINE (ZOLOFT) 100 MG TABLET    TAKE 1 TABLET DAILY       Start Date: 11/28/2022End Date: --    TRAMADOL (ULTRAM) 50 MG TABLET    Take 50 mg by  mouth daily as needed.       Start Date: 1/20/2022 End Date: --   Changed and/or Refilled Medications    Modified Medication Previous Medication    DICLOFENAC SODIUM (VOLTAREN) 1 % GEL diclofenac sodium (VOLTAREN) 1 % Gel       Apply 2 g topically 3 (three) times daily as needed (pain).    Apply 1 g topically 3 (three) times daily as needed.       Start Date: 1/20/2023 End Date: --    Start Date: 10/28/2021End Date: 1/20/2023    FOLIC ACID (FOLVITE) 1 MG TABLET folic acid (FOLVITE) 1 MG tablet       Take 1 tablet (1,000 mcg total) by mouth once daily.    Take 1 tablet (1,000 mcg total) by mouth once daily.       Start Date: 1/20/2023 End Date: --    Start Date: 9/9/2022  End Date: 1/20/2023    HYDROXYCHLOROQUINE (PLAQUENIL) 200 MG TABLET hydrOXYchloroQUINE (PLAQUENIL) 200 mg tablet       Take 1 tablet (200 mg total) by mouth 2 (two) times daily.    Take 1 tablet (200 mg total) by mouth 2 (two) times daily.       Start Date: 1/20/2023 End Date: --    Start Date: 9/9/2022  End Date: 1/20/2023    METHOTREXATE 2.5 MG TAB methotrexate 2.5 MG Tab       Take 8 tablets (20 mg total) by mouth every 7 days. Every Thursday take 4 tab at lunch and 4 tab at supper    Take 8 tablets (20 mg total) by mouth every 7 days. Every Thursday take 4 tab at lunch and 4 tab at supper       Start Date: 1/20/2023 End Date: 1/20/2024    Start Date: 9/9/2022  End Date: 1/20/2023    PREDNISONE (DELTASONE) 5 MG TABLET predniSONE (DELTASONE) 5 MG tablet       Take 1 tablet (5 mg total) by mouth daily as needed (as needed for flare ups for  three consecutives days). AFTER BREAKFAST    Take 1 tablet (5 mg total) by mouth daily as needed (as needed for flare ups for  three consecutives days). AFTER BREAKFAST       Start Date: 1/20/2023 End Date: --    Start Date: 9/9/2022  End Date: 1/20/2023    TIZANIDINE (ZANAFLEX) 4 MG TABLET tiZANidine (ZANAFLEX) 2 MG tablet       Take 1 tablet (4 mg total) by mouth nightly.    Take 1 tablet (2 mg total) by mouth  nightly.       Start Date: 1/20/2023 End Date: --    Start Date: 9/9/2022  End Date: 1/20/2023   Discontinued Medications    DICLOFENAC POTASSIUM ORAL    Take 75 mg by mouth daily as needed.       Start Date: --        End Date: 1/20/2023         Plan:         Problem List Items Addressed This Visit          Immunology/Multi System    Seropositive rheumatoid arthritis - Primary    Relevant Medications    diclofenac sodium (VOLTAREN) 1 % Gel    folic acid (FOLVITE) 1 MG tablet    hydrOXYchloroQUINE (PLAQUENIL) 200 mg tablet    methotrexate 2.5 MG Tab    predniSONE (DELTASONE) 5 MG tablet    tiZANidine (ZANAFLEX) 4 MG tablet     Other Visit Diagnoses       Myalgia        Relevant Medications    diclofenac sodium (VOLTAREN) 1 % Gel    folic acid (FOLVITE) 1 MG tablet    hydrOXYchloroQUINE (PLAQUENIL) 200 mg tablet    methotrexate 2.5 MG Tab    predniSONE (DELTASONE) 5 MG tablet    tiZANidine (ZANAFLEX) 4 MG tablet    Lateral epicondylitis, unspecified laterality        Relevant Medications    diclofenac sodium (VOLTAREN) 1 % Gel    folic acid (FOLVITE) 1 MG tablet    hydrOXYchloroQUINE (PLAQUENIL) 200 mg tablet    methotrexate 2.5 MG Tab    predniSONE (DELTASONE) 5 MG tablet    tiZANidine (ZANAFLEX) 4 MG tablet    Disorder of rotator cuff, unspecified laterality        Relevant Medications    diclofenac sodium (VOLTAREN) 1 % Gel    folic acid (FOLVITE) 1 MG tablet    hydrOXYchloroQUINE (PLAQUENIL) 200 mg tablet    methotrexate 2.5 MG Tab    predniSONE (DELTASONE) 5 MG tablet    tiZANidine (ZANAFLEX) 4 MG tablet

## 2023-01-23 ENCOUNTER — TELEPHONE (OUTPATIENT)
Dept: RHEUMATOLOGY | Facility: CLINIC | Age: 58
End: 2023-01-23
Payer: COMMERCIAL

## 2023-01-23 NOTE — TELEPHONE ENCOUNTER
----- Message from Shefali Brown sent at 1/23/2023  9:02 AM CST -----  Regarding: [facility]  Lalita with [facility] called regarding patient diclofenac gel.  They are offering alternatives first, meloxcam, diclofenac Dr tablet and diclofenac topical solutoion

## 2023-01-24 NOTE — TELEPHONE ENCOUNTER
Called [facility], spoke w/ rep Lilia.  A PA is required for the topical gel.  She will prepare the form and fax it to our office.

## 2023-04-19 ENCOUNTER — TELEPHONE (OUTPATIENT)
Dept: RHEUMATOLOGY | Facility: CLINIC | Age: 58
End: 2023-04-19
Payer: COMMERCIAL

## 2023-04-19 NOTE — TELEPHONE ENCOUNTER
Message was left from Pt for us to confirm his appt date and time. Spoke to Pt to inform him of appointment. He verbalized understanding and thanked for calling.

## 2023-05-18 NOTE — ASSESSMENT & PLAN NOTE
Continue hydroxychloroquine 200 mg b.i.d.  Continue methotrexate 20 mg Q 7 days  Continue folic acid daily  Continue prednisone 5 mg daily p.r.n.  Continue Voltaren gel  Continue tramadol 50 mg daily p.r.n.- Rx by MD reports he hasn't used this in 6 months because his pain and disease activity are much more controlled.    Plaquenil Eye Exam:Instructed to get an eye Exam    Labs ordered today for continued monitoring

## 2023-05-18 NOTE — PROGRESS NOTES
"Subjective:           Patient ID: Truman Gonzalez is a 57 y.o. male.    Chief Complaint: Follow-up      Mr. Gonzalez is a 56 y/o male here for a f./u. He was initially referred by Dr. Maynor Wilson for evaluation of positive CCP and joint pain. He established care with Dr. Cedillo on  8/27/21. Past labs: CCP positive. He was initiated on Hydroxychloroquine in August 2021. He was initiated on methotrexate in 9/9/22. At last visit in January 2023 tizanidine was increased from 2 to 4 mg. No recent labs to review  Today He reports all of his patient's are working well for him.  He has not used tramadol in the last 6 months and occasionally uses prednisone for as needed flare-ups. He denies joint pain involving the MCP PIP wrist elbow shoulders hips knees and ankles bilaterally. The morning stiffness lasting for less than 10 minutes.  He is sleeping well, with improvement after taking the Tizanidine.  This has been associated with myalgias.  Muscle aches are 1/10 in intensity dull in quality and continuous.  Denies fever and chills.  No recent labs completed for review.       Review of Systems   Constitutional:  Negative for appetite change, chills and fever.   HENT:  Negative for congestion, ear pain, mouth sores, nosebleeds and trouble swallowing.    Eyes:  Negative for photophobia and discharge.   Respiratory:  Negative for chest tightness and shortness of breath.    Cardiovascular:  Negative for chest pain.   Gastrointestinal:  Negative for abdominal pain and vomiting.   Endocrine: Negative.    Genitourinary:  Negative for hematuria.   Musculoskeletal:         As per HPI   Skin:  Negative for rash.   Neurological:  Negative for weakness.       Objective:   /88 (BP Location: Left arm, Patient Position: Sitting, BP Method: Medium (Automatic))   Pulse 61   Temp 98 °F (36.7 °C) (Temporal)   Resp 18   Ht 5' 10" (1.778 m)   Wt 109.3 kg (241 lb)   SpO2 97%   BMI 34.58 kg/m²          Physical Exam "   Constitutional: He is oriented to person, place, and time. He appears well-developed and well-nourished. No distress.   HENT:   Head: Normocephalic and atraumatic.   Right Ear: External ear normal.   Left Ear: External ear normal.   Eyes: Pupils are equal, round, and reactive to light.   Cardiovascular: Normal rate.   Pulmonary/Chest: Effort normal and breath sounds normal.   Abdominal: Soft. There is no abdominal tenderness.   Musculoskeletal:      Right shoulder: Normal.      Left shoulder: Normal.      Right elbow: Normal.      Left elbow: Normal.      Right wrist: Normal.      Left wrist: Normal.      Cervical back: Neck supple.      Right hip: Normal.      Left hip: Normal.      Right knee: Normal.      Left knee: Normal.   Lymphadenopathy:     He has no cervical adenopathy.   Neurological: He is alert and oriented to person, place, and time. He displays normal reflexes. No cranial nerve deficit or sensory deficit. He exhibits normal muscle tone. Coordination normal.   Skin: No rash noted. No erythema.   Vitals reviewed.      Right Side Rheumatological Exam     Examination finds the shoulder, elbow, wrist, knee, 1st PIP, 1st MCP, 2nd PIP, 2nd MCP, 3rd PIP, 3rd MCP, 4th PIP, 4th MCP, 5th PIP, 5th MCP, hip, ankle, 1st MTP, 2nd MTP, 3rd MTP, 4th MTP and 5th MTP normal.    Left Side Rheumatological Exam     Examination finds the shoulder, elbow, wrist, knee, 1st PIP, 1st MCP, 2nd PIP, 2nd MCP, 3rd PIP, 3rd MCP, 4th PIP, 4th MCP, 5th PIP, 5th MCP, hip, ankle, 1st MTP, 2nd MTP, 3rd MTP, 4th MTP and 5th MTP normal.         Completed Fibromyalgia exam 2/18 tender points.    No data to display     Assessment:         Medication List with Changes/Refills   Current Medications    ALPRAZOLAM (XANAX) 0.25 MG TABLET    Take 0.25 mg by mouth nightly as needed for Anxiety.       Start Date: --        End Date: --    DICLOFENAC SODIUM (VOLTAREN) 1 % GEL    Apply 2 g topically 3 (three) times daily as needed (pain).        Start Date: 1/20/2023 End Date: --    OMEGA-3 FATTY ACIDS/FISH OIL (FISH OIL-OMEGA-3 FATTY ACIDS) 300-1,000 MG CAPSULE    Take 1 capsule by mouth once daily.       Start Date: --        End Date: --    SERTRALINE (ZOLOFT) 100 MG TABLET    TAKE 1 TABLET DAILY       Start Date: 11/28/2022End Date: --    TRAMADOL (ULTRAM) 50 MG TABLET    Take 50 mg by mouth daily as needed.       Start Date: 1/20/2022 End Date: --   Changed and/or Refilled Medications    Modified Medication Previous Medication    FOLIC ACID (FOLVITE) 1 MG TABLET folic acid (FOLVITE) 1 MG tablet       Take 1 tablet (1,000 mcg total) by mouth once daily.    Take 1 tablet (1,000 mcg total) by mouth once daily.       Start Date: 5/22/2023 End Date: --    Start Date: 1/20/2023 End Date: 5/22/2023    HYDROXYCHLOROQUINE (PLAQUENIL) 200 MG TABLET hydrOXYchloroQUINE (PLAQUENIL) 200 mg tablet       Take 1 tablet (200 mg total) by mouth 2 (two) times daily.    Take 1 tablet (200 mg total) by mouth 2 (two) times daily.       Start Date: 5/22/2023 End Date: --    Start Date: 1/20/2023 End Date: 5/22/2023    METHOTREXATE 2.5 MG TAB methotrexate 2.5 MG Tab       Take 8 tablets (20 mg total) by mouth every 7 days. Every Thursday take 4 tab at lunch and 4 tab at supper    Take 8 tablets (20 mg total) by mouth every 7 days. Every Thursday take 4 tab at lunch and 4 tab at supper       Start Date: 5/22/2023 End Date: 5/21/2024    Start Date: 1/20/2023 End Date: 5/22/2023    PREDNISONE (DELTASONE) 5 MG TABLET predniSONE (DELTASONE) 5 MG tablet       Take 1 tablet (5 mg total) by mouth daily as needed (as needed for flare ups for  three consecutives days). AFTER BREAKFAST    Take 1 tablet (5 mg total) by mouth daily as needed (as needed for flare ups for  three consecutives days). AFTER BREAKFAST       Start Date: 5/22/2023 End Date: --    Start Date: 1/20/2023 End Date: 5/22/2023    TIZANIDINE (ZANAFLEX) 4 MG TABLET tiZANidine (ZANAFLEX) 4 MG tablet       Take 1 tablet (4  mg total) by mouth nightly.    Take 1 tablet (4 mg total) by mouth nightly.       Start Date: 5/22/2023 End Date: --    Start Date: 1/20/2023 End Date: 5/22/2023         ICD-10-CM ICD-9-CM   1. Seropositive rheumatoid arthritis  M05.9 714.0   2. Muscle pain  M79.10 729.1   3. Anxiety state  F41.1 300.00           Plan:       1. Seropositive rheumatoid arthritis  Assessment & Plan:  Continue hydroxychloroquine 200 mg b.i.d.  Continue methotrexate 20 mg Q 7 days  Continue folic acid daily  Continue prednisone 5 mg daily p.r.n.  Continue Voltaren gel  Continue tramadol 50 mg daily p.r.n.- Rx by MD reports he hasn't used this in 6 months because his pain and disease activity are much more controlled.    Plaquenil Eye Exam:Instructed to get an eye Exam    Labs ordered today for continued monitoring    Orders:  -     methotrexate 2.5 MG Tab; Take 8 tablets (20 mg total) by mouth every 7 days. Every Thursday take 4 tab at lunch and 4 tab at supper  Dispense: 100 tablet; Refill: 3  -     folic acid (FOLVITE) 1 MG tablet; Take 1 tablet (1,000 mcg total) by mouth once daily.  Dispense: 90 tablet; Refill: 3  -     hydrOXYchloroQUINE (PLAQUENIL) 200 mg tablet; Take 1 tablet (200 mg total) by mouth 2 (two) times daily.  Dispense: 180 tablet; Refill: 3  -     predniSONE (DELTASONE) 5 MG tablet; Take 1 tablet (5 mg total) by mouth daily as needed (as needed for flare ups for  three consecutives days). AFTER BREAKFAST  Dispense: 90 tablet; Refill: 3  -     CBC Auto Differential; Future; Expected date: 05/22/2023  -     Comprehensive Metabolic Panel; Future; Expected date: 05/22/2023  -     C-Reactive Protein; Future; Expected date: 05/22/2023    2. Muscle pain  Assessment & Plan:  Continue tizanidine 4 mg nightly    Orders:  -     tiZANidine (ZANAFLEX) 4 MG tablet; Take 1 tablet (4 mg total) by mouth nightly.  Dispense: 90 tablet; Refill: 3    3. Anxiety state  Assessment & Plan:  Continue Xanax 0.25 mg nightly p.r.n. for anxiety-  per pcp  Continue Zoloft 100 mg- Rx by managing provider (PCP

## 2023-05-18 NOTE — ASSESSMENT & PLAN NOTE
Continue Xanax 0.25 mg nightly p.r.n. for anxiety- per pcp  Continue Zoloft 100 mg- Rx by managing provider (PCP   No

## 2023-05-22 ENCOUNTER — OFFICE VISIT (OUTPATIENT)
Dept: RHEUMATOLOGY | Facility: CLINIC | Age: 58
End: 2023-05-22
Payer: COMMERCIAL

## 2023-05-22 VITALS
HEART RATE: 61 BPM | DIASTOLIC BLOOD PRESSURE: 88 MMHG | RESPIRATION RATE: 18 BRPM | TEMPERATURE: 98 F | WEIGHT: 241 LBS | BODY MASS INDEX: 34.5 KG/M2 | HEIGHT: 70 IN | OXYGEN SATURATION: 97 % | SYSTOLIC BLOOD PRESSURE: 137 MMHG

## 2023-05-22 DIAGNOSIS — M05.9 SEROPOSITIVE RHEUMATOID ARTHRITIS: Primary | ICD-10-CM

## 2023-05-22 DIAGNOSIS — M79.10 MUSCLE PAIN: ICD-10-CM

## 2023-05-22 DIAGNOSIS — F41.1 ANXIETY STATE: ICD-10-CM

## 2023-05-22 PROCEDURE — 99213 PR OFFICE/OUTPT VISIT, EST, LEVL III, 20-29 MIN: ICD-10-PCS | Mod: S$GLB,,,

## 2023-05-22 PROCEDURE — 3008F PR BODY MASS INDEX (BMI) DOCUMENTED: ICD-10-PCS | Mod: CPTII,S$GLB,,

## 2023-05-22 PROCEDURE — 3079F PR MOST RECENT DIASTOLIC BLOOD PRESSURE 80-89 MM HG: ICD-10-PCS | Mod: CPTII,S$GLB,,

## 2023-05-22 PROCEDURE — 99213 OFFICE O/P EST LOW 20 MIN: CPT | Mod: S$GLB,,,

## 2023-05-22 PROCEDURE — 1159F PR MEDICATION LIST DOCUMENTED IN MEDICAL RECORD: ICD-10-PCS | Mod: CPTII,S$GLB,,

## 2023-05-22 PROCEDURE — 1159F MED LIST DOCD IN RCRD: CPT | Mod: CPTII,S$GLB,,

## 2023-05-22 PROCEDURE — 99999 PR PBB SHADOW E&M-EST. PATIENT-LVL IV: ICD-10-PCS | Mod: PBBFAC,,,

## 2023-05-22 PROCEDURE — 3075F SYST BP GE 130 - 139MM HG: CPT | Mod: CPTII,S$GLB,,

## 2023-05-22 PROCEDURE — 3079F DIAST BP 80-89 MM HG: CPT | Mod: CPTII,S$GLB,,

## 2023-05-22 PROCEDURE — 3075F PR MOST RECENT SYSTOLIC BLOOD PRESS GE 130-139MM HG: ICD-10-PCS | Mod: CPTII,S$GLB,,

## 2023-05-22 PROCEDURE — 3008F BODY MASS INDEX DOCD: CPT | Mod: CPTII,S$GLB,,

## 2023-05-22 PROCEDURE — 99999 PR PBB SHADOW E&M-EST. PATIENT-LVL IV: CPT | Mod: PBBFAC,,,

## 2023-05-22 RX ORDER — TIZANIDINE 4 MG/1
4 TABLET ORAL NIGHTLY
Qty: 90 TABLET | Refills: 3 | Status: SHIPPED | OUTPATIENT
Start: 2023-05-22 | End: 2023-09-26 | Stop reason: SDUPTHER

## 2023-05-22 RX ORDER — FOLIC ACID 1 MG/1
1000 TABLET ORAL DAILY
Qty: 90 TABLET | Refills: 3 | Status: SHIPPED | OUTPATIENT
Start: 2023-05-22 | End: 2023-09-26 | Stop reason: SDUPTHER

## 2023-05-22 RX ORDER — METHOTREXATE 2.5 MG/1
20 TABLET ORAL
Qty: 100 TABLET | Refills: 3 | Status: SHIPPED | OUTPATIENT
Start: 2023-05-22 | End: 2023-09-26 | Stop reason: SDUPTHER

## 2023-05-22 RX ORDER — PREDNISONE 5 MG/1
5 TABLET ORAL DAILY PRN
Qty: 90 TABLET | Refills: 3 | Status: SHIPPED | OUTPATIENT
Start: 2023-05-22 | End: 2023-12-04 | Stop reason: ALTCHOICE

## 2023-05-22 RX ORDER — HYDROXYCHLOROQUINE SULFATE 200 MG/1
200 TABLET, FILM COATED ORAL 2 TIMES DAILY
Qty: 180 TABLET | Refills: 3 | Status: SHIPPED | OUTPATIENT
Start: 2023-05-22 | End: 2023-09-26 | Stop reason: SDUPTHER

## 2023-06-05 ENCOUNTER — LAB VISIT (OUTPATIENT)
Dept: LAB | Facility: HOSPITAL | Age: 58
End: 2023-06-05
Attending: INTERNAL MEDICINE
Payer: COMMERCIAL

## 2023-06-05 ENCOUNTER — TELEPHONE (OUTPATIENT)
Dept: RHEUMATOLOGY | Facility: CLINIC | Age: 58
End: 2023-06-05
Payer: COMMERCIAL

## 2023-06-05 DIAGNOSIS — M05.9 SEROPOSITIVE RHEUMATOID ARTHRITIS: ICD-10-CM

## 2023-06-05 LAB
ALBUMIN SERPL-MCNC: 4.2 G/DL (ref 3.5–5)
ALBUMIN/GLOB SERPL: 1.6 RATIO (ref 1.1–2)
ALP SERPL-CCNC: 94 UNIT/L (ref 40–150)
ALT SERPL-CCNC: 30 UNIT/L (ref 0–55)
AST SERPL-CCNC: 20 UNIT/L (ref 5–34)
BASOPHILS # BLD AUTO: 0.03 X10(3)/MCL
BASOPHILS NFR BLD AUTO: 0.5 %
BILIRUBIN DIRECT+TOT PNL SERPL-MCNC: 0.4 MG/DL
BUN SERPL-MCNC: 16.7 MG/DL (ref 8.4–25.7)
CALCIUM SERPL-MCNC: 9.4 MG/DL (ref 8.4–10.2)
CHLORIDE SERPL-SCNC: 106 MMOL/L (ref 98–107)
CO2 SERPL-SCNC: 28 MMOL/L (ref 22–29)
CREAT SERPL-MCNC: 0.85 MG/DL (ref 0.73–1.18)
CRP SERPL-MCNC: 17.3 MG/L
EOSINOPHIL # BLD AUTO: 0.14 X10(3)/MCL (ref 0–0.9)
EOSINOPHIL NFR BLD AUTO: 2.5 %
ERYTHROCYTE [DISTWIDTH] IN BLOOD BY AUTOMATED COUNT: 13.2 % (ref 11.5–17)
GFR SERPLBLD CREATININE-BSD FMLA CKD-EPI: >60 MLS/MIN/1.73/M2
GLOBULIN SER-MCNC: 2.6 GM/DL (ref 2.4–3.5)
GLUCOSE SERPL-MCNC: 88 MG/DL (ref 74–100)
HCT VFR BLD AUTO: 44.9 % (ref 42–52)
HGB BLD-MCNC: 14.6 G/DL (ref 14–18)
IMM GRANULOCYTES # BLD AUTO: 0.03 X10(3)/MCL (ref 0–0.04)
IMM GRANULOCYTES NFR BLD AUTO: 0.5 %
LYMPHOCYTES # BLD AUTO: 1.44 X10(3)/MCL (ref 0.6–4.6)
LYMPHOCYTES NFR BLD AUTO: 25.5 %
MCH RBC QN AUTO: 30.4 PG (ref 27–31)
MCHC RBC AUTO-ENTMCNC: 32.5 G/DL (ref 33–36)
MCV RBC AUTO: 93.3 FL (ref 80–94)
MONOCYTES # BLD AUTO: 0.59 X10(3)/MCL (ref 0.1–1.3)
MONOCYTES NFR BLD AUTO: 10.5 %
NEUTROPHILS # BLD AUTO: 3.41 X10(3)/MCL (ref 2.1–9.2)
NEUTROPHILS NFR BLD AUTO: 60.5 %
NRBC BLD AUTO-RTO: 0 %
PLATELET # BLD AUTO: 241 X10(3)/MCL (ref 130–400)
PMV BLD AUTO: 10.3 FL (ref 7.4–10.4)
POTASSIUM SERPL-SCNC: 4.6 MMOL/L (ref 3.5–5.1)
PROT SERPL-MCNC: 6.8 GM/DL (ref 6.4–8.3)
RBC # BLD AUTO: 4.81 X10(6)/MCL (ref 4.7–6.1)
SODIUM SERPL-SCNC: 143 MMOL/L (ref 136–145)
WBC # SPEC AUTO: 5.64 X10(3)/MCL (ref 4.5–11.5)

## 2023-06-05 PROCEDURE — 85025 COMPLETE CBC W/AUTO DIFF WBC: CPT

## 2023-06-05 PROCEDURE — 36415 COLL VENOUS BLD VENIPUNCTURE: CPT

## 2023-06-05 PROCEDURE — 80053 COMPREHEN METABOLIC PANEL: CPT

## 2023-06-05 PROCEDURE — 86140 C-REACTIVE PROTEIN: CPT

## 2023-06-05 NOTE — TELEPHONE ENCOUNTER
Labs reviewed.  His CRP was elevated which means that he still does have inflammation.  His blood count, kidney function, liver functions are all normal and he can continue his current medications.  Thank you

## 2023-09-25 NOTE — ASSESSMENT & PLAN NOTE
Flare up x 2 since last visit lasting approximately 8 hours each and symptoms relieved by the prednisone 5 mg.    Continue hydroxychloroquine 200 mg b.i.d.  Continue methotrexate 20 mg Q 7 days  Continue folic acid daily  Continue prednisone 5 mg daily p.r.n.( used 2 x times since last visit)  Continue Voltaren gel  Continue tramadol 50 mg daily p.r.n.- Rx by MD reports he hasn't used this in 6 months because his pain and disease activity are much more controlled.     Plaquenil Eye Exam: Plauqenil Eye Exam completed in August 2023;  Dr Nicole?      Labs completed at last visit and reviewed with the patient today. Labs ordered by PCP and will complete next month before wellness that includes CBC and CMP. Will add CRP and he can complete with his future blood work.    Discussed the relationship of alcohol consumption and methotrexate on the liver.

## 2023-09-25 NOTE — PROGRESS NOTES
Subjective:           Patient ID: Truman Gonzalez is a 57 y.o. male.    Chief Complaint: Follow-up (Right shoulder pain )      Mr. Gonzalez is a 58 y/o male here for a f./u. He was initially referred by Dr. Maynor Wilson for evaluation of positive CCP and joint pain. He established care with Dr. Cedillo on  8/27/21. Past labs: CCP positive. He was initiated on Hydroxychloroquine in August 2021. He was initiated on methotrexate in 9/9/22. In  January 2023 tizanidine was increased from 2 to 4 mg. Labs completed at last office visit and reviewed with the patient. He reports that he has had 2 flare ups since last office visit, lasting approximately 8 hours and symptoms relieved by the PRN prednisone 5mg. He does report sometime missing the night time dose of HCQ.     Today He reports all of his patient's are working well for him.  He has not used tramadol in the last 6 months and occasionally uses prednisone for as needed flare-ups. He denies joint pain involving the MCP PIP wrist elbow shoulders hips knees and ankles bilaterally. The morning stiffness lasting for less than 10 minutes.  He is sleeping well, with improvement after taking the Tizanidine.  This has been associated with myalgias.  Muscle aches are 1/10 in intensity dull in quality and continuous.  Denies fever and chills.  No recent labs completed for review.         Review of Systems   Constitutional:  Negative for appetite change, chills and fever.   HENT:  Negative for congestion, ear pain, mouth sores, nosebleeds and trouble swallowing.    Eyes:  Negative for photophobia and discharge.   Respiratory:  Negative for chest tightness and shortness of breath.    Cardiovascular:  Negative for chest pain.   Gastrointestinal:  Negative for abdominal pain and vomiting.   Endocrine: Negative.    Genitourinary:  Negative for hematuria.   Musculoskeletal:         As per HPI   Skin:  Negative for rash.   Neurological:  Negative for weakness.         Objective:  "  /84 (BP Location: Right arm, Patient Position: Sitting, BP Method: Large (Automatic))   Pulse 66   Temp 98 °F (36.7 °C) (Oral)   Resp 18   Ht 5' 10" (1.778 m)   Wt 115.8 kg (255 lb 3.2 oz)   SpO2 98%   BMI 36.62 kg/m²          Physical Exam   Constitutional: He is oriented to person, place, and time. He appears well-developed and well-nourished. No distress.   HENT:   Head: Normocephalic and atraumatic.   Right Ear: External ear normal.   Left Ear: External ear normal.   Eyes: Pupils are equal, round, and reactive to light.   Cardiovascular: Normal rate.   Pulmonary/Chest: Effort normal.   Abdominal: Soft. There is no abdominal tenderness.   Musculoskeletal:      Right shoulder: Normal.      Left shoulder: Normal.      Right elbow: Normal.      Left elbow: Normal.      Right wrist: Normal.      Left wrist: Normal.      Cervical back: Neck supple.      Right hip: Normal.      Left hip: Normal.      Right knee: Normal.      Left knee: Normal.   Lymphadenopathy:     He has no cervical adenopathy.   Neurological: He is alert and oriented to person, place, and time. He displays normal reflexes. No cranial nerve deficit or sensory deficit. He exhibits normal muscle tone. Coordination normal.   Skin: No rash noted. No erythema.   Vitals reviewed.      Right Side Rheumatological Exam     Examination finds the shoulder, elbow, wrist, knee, 1st PIP, 1st MCP, 2nd PIP, 2nd MCP, 3rd PIP, 3rd MCP, 4th PIP, 4th MCP, 5th PIP, 5th MCP, hip, ankle, 1st MTP, 2nd MTP, 3rd MTP, 4th MTP and 5th MTP normal.    Left Side Rheumatological Exam     Examination finds the shoulder, elbow, wrist, knee, 1st PIP, 1st MCP, 2nd PIP, 2nd MCP, 3rd PIP, 3rd MCP, 4th PIP, 4th MCP, 5th PIP, 5th MCP, hip, ankle, 1st MTP, 2nd MTP, 3rd MTP, 4th MTP and 5th MTP normal.           Completed Fibromyalgia exam 2/18 tender points.    No data to display     Assessment:         Medication List with Changes/Refills   New Medications    " METHYLPREDNISOLONE (MEDROL DOSEPACK) 4 MG TABLET    use as directed       Start Date: 9/26/2023 End Date: --   Current Medications    ALPRAZOLAM (XANAX) 0.25 MG TABLET    Take 0.25 mg by mouth nightly as needed for Anxiety.       Start Date: --        End Date: --    DICLOFENAC SODIUM (VOLTAREN) 1 % GEL    Apply 2 g topically 3 (three) times daily as needed (pain).       Start Date: 1/20/2023 End Date: --    OMEGA-3 FATTY ACIDS/FISH OIL (FISH OIL-OMEGA-3 FATTY ACIDS) 300-1,000 MG CAPSULE    Take 1 capsule by mouth once daily.       Start Date: --        End Date: --    PREDNISONE (DELTASONE) 5 MG TABLET    Take 1 tablet (5 mg total) by mouth daily as needed (as needed for flare ups for  three consecutives days). AFTER BREAKFAST       Start Date: 5/22/2023 End Date: --    SERTRALINE (ZOLOFT) 100 MG TABLET    TAKE 1 TABLET DAILY       Start Date: 11/28/2022End Date: --    TRAMADOL (ULTRAM) 50 MG TABLET    Take 50 mg by mouth daily as needed.       Start Date: 1/20/2022 End Date: --   Changed and/or Refilled Medications    Modified Medication Previous Medication    FOLIC ACID (FOLVITE) 1 MG TABLET folic acid (FOLVITE) 1 MG tablet       Take 1 tablet (1,000 mcg total) by mouth once daily.    Take 1 tablet (1,000 mcg total) by mouth once daily.       Start Date: 9/26/2023 End Date: --    Start Date: 5/22/2023 End Date: 9/26/2023    HYDROXYCHLOROQUINE (PLAQUENIL) 200 MG TABLET hydrOXYchloroQUINE (PLAQUENIL) 200 mg tablet       Take 1 tablet (200 mg total) by mouth 2 (two) times daily.    Take 1 tablet (200 mg total) by mouth 2 (two) times daily.       Start Date: 9/26/2023 End Date: --    Start Date: 5/22/2023 End Date: 9/26/2023    METHOTREXATE 2.5 MG TAB methotrexate 2.5 MG Tab       Take 8 tablets (20 mg total) by mouth every 7 days. Every Thursday take 4 tab at lunch and 4 tab at supper    Take 8 tablets (20 mg total) by mouth every 7 days. Every Thursday take 4 tab at lunch and 4 tab at supper       Start Date:  9/26/2023 End Date: 9/25/2024    Start Date: 5/22/2023 End Date: 9/26/2023    TIZANIDINE (ZANAFLEX) 4 MG TABLET tiZANidine (ZANAFLEX) 4 MG tablet       Take 1 tablet (4 mg total) by mouth nightly.    Take 1 tablet (4 mg total) by mouth nightly.       Start Date: 9/26/2023 End Date: --    Start Date: 5/22/2023 End Date: 9/26/2023         ICD-10-CM ICD-9-CM   1. Bursitis of right shoulder  M75.51 726.10   2. Seropositive rheumatoid arthritis  M05.9 714.0   3. Muscle pain  M79.10 729.1   4. Anxiety state  F41.1 300.00           Plan:       1. Bursitis of right shoulder  -     methylPREDNISolone (MEDROL DOSEPACK) 4 mg tablet; use as directed  Dispense: 21 each; Refill: 0    2. Seropositive rheumatoid arthritis  Assessment & Plan:  Flare up x 2 since last visit lasting approximately 8 hours each and symptoms relieved by the prednisone 5 mg.    Continue hydroxychloroquine 200 mg b.i.d.  Continue methotrexate 20 mg Q 7 days  Continue folic acid daily  Continue prednisone 5 mg daily p.r.n.( used 2 x times since last visit)  Continue Voltaren gel  Continue tramadol 50 mg daily p.r.n.- Rx by MD reports he hasn't used this in 6 months because his pain and disease activity are much more controlled.     Plaquenil Eye Exam: Plauenil Eye Exam completed in August 2023;  Dr Nicole?      Labs completed at last visit and reviewed with the patient today. Labs ordered by PCP and will complete next month before wellness that includes CBC and CMP. Will add CRP and he can complete with his future blood work.    Discussed the relationship of alcohol consumption and methotrexate on the liver.    Orders:  -     methotrexate 2.5 MG Tab; Take 8 tablets (20 mg total) by mouth every 7 days. Every Thursday take 4 tab at lunch and 4 tab at supper  Dispense: 100 tablet; Refill: 3  -     hydroxychloroquine (PLAQUENIL) 200 mg tablet; Take 1 tablet (200 mg total) by mouth 2 (two) times daily.  Dispense: 180 tablet; Refill: 3  -     folic acid  (FOLVITE) 1 MG tablet; Take 1 tablet (1,000 mcg total) by mouth once daily.  Dispense: 90 tablet; Refill: 3  -     C-Reactive Protein; Future; Expected date: 09/26/2023    3. Muscle pain  Assessment & Plan:  Continue tizanidine 4 mg nightly    Orders:  -     tiZANidine (ZANAFLEX) 4 MG tablet; Take 1 tablet (4 mg total) by mouth nightly.  Dispense: 90 tablet; Refill: 3    4. Anxiety state  Assessment & Plan:  Continue Xanax 0.25 mg nightly p.r.n. for anxiety- per pcp  Continue Zoloft 100 mg- Rx by managing provider (PCP

## 2023-09-25 NOTE — ASSESSMENT & PLAN NOTE
Continue Xanax 0.25 mg nightly p.r.n. for anxiety- per pcp  Continue Zoloft 100 mg- Rx by managing provider (PCP

## 2023-09-26 ENCOUNTER — OFFICE VISIT (OUTPATIENT)
Dept: RHEUMATOLOGY | Facility: CLINIC | Age: 58
End: 2023-09-26
Payer: COMMERCIAL

## 2023-09-26 VITALS
BODY MASS INDEX: 36.53 KG/M2 | TEMPERATURE: 98 F | DIASTOLIC BLOOD PRESSURE: 84 MMHG | RESPIRATION RATE: 18 BRPM | OXYGEN SATURATION: 98 % | HEIGHT: 70 IN | HEART RATE: 66 BPM | SYSTOLIC BLOOD PRESSURE: 131 MMHG | WEIGHT: 255.19 LBS

## 2023-09-26 DIAGNOSIS — M79.10 MUSCLE PAIN: ICD-10-CM

## 2023-09-26 DIAGNOSIS — M05.9 SEROPOSITIVE RHEUMATOID ARTHRITIS: ICD-10-CM

## 2023-09-26 DIAGNOSIS — M75.51 BURSITIS OF RIGHT SHOULDER: Primary | ICD-10-CM

## 2023-09-26 DIAGNOSIS — F41.1 ANXIETY STATE: ICD-10-CM

## 2023-09-26 PROCEDURE — 3008F PR BODY MASS INDEX (BMI) DOCUMENTED: ICD-10-PCS | Mod: CPTII,S$GLB,,

## 2023-09-26 PROCEDURE — 99999 PR PBB SHADOW E&M-EST. PATIENT-LVL IV: ICD-10-PCS | Mod: PBBFAC,,,

## 2023-09-26 PROCEDURE — 1159F MED LIST DOCD IN RCRD: CPT | Mod: CPTII,S$GLB,,

## 2023-09-26 PROCEDURE — 99214 PR OFFICE/OUTPT VISIT, EST, LEVL IV, 30-39 MIN: ICD-10-PCS | Mod: S$GLB,,,

## 2023-09-26 PROCEDURE — 1159F PR MEDICATION LIST DOCUMENTED IN MEDICAL RECORD: ICD-10-PCS | Mod: CPTII,S$GLB,,

## 2023-09-26 PROCEDURE — 99214 OFFICE O/P EST MOD 30 MIN: CPT | Mod: S$GLB,,,

## 2023-09-26 PROCEDURE — 3075F SYST BP GE 130 - 139MM HG: CPT | Mod: CPTII,S$GLB,,

## 2023-09-26 PROCEDURE — 3075F PR MOST RECENT SYSTOLIC BLOOD PRESS GE 130-139MM HG: ICD-10-PCS | Mod: CPTII,S$GLB,,

## 2023-09-26 PROCEDURE — 99999 PR PBB SHADOW E&M-EST. PATIENT-LVL IV: CPT | Mod: PBBFAC,,,

## 2023-09-26 PROCEDURE — 3079F PR MOST RECENT DIASTOLIC BLOOD PRESSURE 80-89 MM HG: ICD-10-PCS | Mod: CPTII,S$GLB,,

## 2023-09-26 PROCEDURE — 3079F DIAST BP 80-89 MM HG: CPT | Mod: CPTII,S$GLB,,

## 2023-09-26 PROCEDURE — 3008F BODY MASS INDEX DOCD: CPT | Mod: CPTII,S$GLB,,

## 2023-09-26 RX ORDER — HYDROXYCHLOROQUINE SULFATE 200 MG/1
200 TABLET, FILM COATED ORAL 2 TIMES DAILY
Qty: 180 TABLET | Refills: 3 | Status: SHIPPED | OUTPATIENT
Start: 2023-09-26

## 2023-09-26 RX ORDER — METHYLPREDNISOLONE 4 MG/1
TABLET ORAL
Qty: 21 EACH | Refills: 0 | Status: SHIPPED | OUTPATIENT
Start: 2023-09-26 | End: 2023-12-04

## 2023-09-26 RX ORDER — METHOTREXATE 2.5 MG/1
20 TABLET ORAL
Qty: 100 TABLET | Refills: 3 | Status: SHIPPED | OUTPATIENT
Start: 2023-09-26 | End: 2024-09-25

## 2023-09-26 RX ORDER — FOLIC ACID 1 MG/1
1000 TABLET ORAL DAILY
Qty: 90 TABLET | Refills: 3 | Status: SHIPPED | OUTPATIENT
Start: 2023-09-26

## 2023-09-26 RX ORDER — TIZANIDINE 4 MG/1
4 TABLET ORAL NIGHTLY
Qty: 90 TABLET | Refills: 3 | Status: SHIPPED | OUTPATIENT
Start: 2023-09-26

## 2023-11-23 DIAGNOSIS — F32.A DEPRESSION, UNSPECIFIED DEPRESSION TYPE: ICD-10-CM

## 2023-11-24 ENCOUNTER — LAB VISIT (OUTPATIENT)
Dept: LAB | Facility: HOSPITAL | Age: 58
End: 2023-11-24
Attending: INTERNAL MEDICINE
Payer: COMMERCIAL

## 2023-11-24 DIAGNOSIS — Z00.00 WELLNESS EXAMINATION: ICD-10-CM

## 2023-11-24 DIAGNOSIS — M05.9 SEROPOSITIVE RHEUMATOID ARTHRITIS: ICD-10-CM

## 2023-11-24 LAB
ALBUMIN SERPL-MCNC: 4 G/DL (ref 3.5–5)
ALBUMIN/GLOB SERPL: 1.6 RATIO (ref 1.1–2)
ALP SERPL-CCNC: 92 UNIT/L (ref 40–150)
ALT SERPL-CCNC: 36 UNIT/L (ref 0–55)
APPEARANCE UR: CLEAR
AST SERPL-CCNC: 26 UNIT/L (ref 5–34)
BACTERIA #/AREA URNS AUTO: ABNORMAL /HPF
BASOPHILS # BLD AUTO: 0.02 X10(3)/MCL
BASOPHILS NFR BLD AUTO: 0.4 %
BILIRUB SERPL-MCNC: 0.5 MG/DL
BILIRUB UR QL STRIP.AUTO: NEGATIVE
BUN SERPL-MCNC: 15.7 MG/DL (ref 8.4–25.7)
CALCIUM SERPL-MCNC: 9 MG/DL (ref 8.4–10.2)
CHLORIDE SERPL-SCNC: 108 MMOL/L (ref 98–107)
CHOLEST SERPL-MCNC: 127 MG/DL
CHOLEST/HDLC SERPL: 2 {RATIO} (ref 0–5)
CO2 SERPL-SCNC: 26 MMOL/L (ref 22–29)
COLOR UR AUTO: ABNORMAL
CREAT SERPL-MCNC: 0.76 MG/DL (ref 0.73–1.18)
CRP SERPL-MCNC: 17.2 MG/L
EOSINOPHIL # BLD AUTO: 0.09 X10(3)/MCL (ref 0–0.9)
EOSINOPHIL NFR BLD AUTO: 1.8 %
ERYTHROCYTE [DISTWIDTH] IN BLOOD BY AUTOMATED COUNT: 13.2 % (ref 11.5–17)
GFR SERPLBLD CREATININE-BSD FMLA CKD-EPI: >60 MLS/MIN/1.73/M2
GLOBULIN SER-MCNC: 2.5 GM/DL (ref 2.4–3.5)
GLUCOSE SERPL-MCNC: 100 MG/DL (ref 74–100)
GLUCOSE UR QL STRIP.AUTO: NORMAL
HCT VFR BLD AUTO: 43.8 % (ref 42–52)
HDLC SERPL-MCNC: 52 MG/DL (ref 35–60)
HGB BLD-MCNC: 14.3 G/DL (ref 14–18)
IMM GRANULOCYTES # BLD AUTO: 0.02 X10(3)/MCL (ref 0–0.04)
IMM GRANULOCYTES NFR BLD AUTO: 0.4 %
KETONES UR QL STRIP.AUTO: NEGATIVE
LDLC SERPL CALC-MCNC: 68 MG/DL (ref 50–140)
LEUKOCYTE ESTERASE UR QL STRIP.AUTO: NEGATIVE
LYMPHOCYTES # BLD AUTO: 1.27 X10(3)/MCL (ref 0.6–4.6)
LYMPHOCYTES NFR BLD AUTO: 24.8 %
MCH RBC QN AUTO: 30.1 PG (ref 27–31)
MCHC RBC AUTO-ENTMCNC: 32.6 G/DL (ref 33–36)
MCV RBC AUTO: 92.2 FL (ref 80–94)
MONOCYTES # BLD AUTO: 0.57 X10(3)/MCL (ref 0.1–1.3)
MONOCYTES NFR BLD AUTO: 11.1 %
MUCOUS THREADS URNS QL MICRO: ABNORMAL /LPF
NEUTROPHILS # BLD AUTO: 3.16 X10(3)/MCL (ref 2.1–9.2)
NEUTROPHILS NFR BLD AUTO: 61.5 %
NITRITE UR QL STRIP.AUTO: NEGATIVE
NRBC BLD AUTO-RTO: 0 %
PH UR STRIP.AUTO: 5.5 [PH]
PLATELET # BLD AUTO: 219 X10(3)/MCL (ref 130–400)
PMV BLD AUTO: 9.8 FL (ref 7.4–10.4)
POTASSIUM SERPL-SCNC: 4.7 MMOL/L (ref 3.5–5.1)
PROT SERPL-MCNC: 6.5 GM/DL (ref 6.4–8.3)
PROT UR QL STRIP.AUTO: ABNORMAL
PSA SERPL-MCNC: 2.58 NG/ML
RBC # BLD AUTO: 4.75 X10(6)/MCL (ref 4.7–6.1)
RBC #/AREA URNS AUTO: ABNORMAL /HPF
RBC UR QL AUTO: ABNORMAL
SODIUM SERPL-SCNC: 140 MMOL/L (ref 136–145)
SP GR UR STRIP.AUTO: 1.02 (ref 1–1.03)
SQUAMOUS #/AREA URNS LPF: ABNORMAL /HPF
TRIGL SERPL-MCNC: 35 MG/DL (ref 34–140)
UROBILINOGEN UR STRIP-ACNC: NORMAL
VLDLC SERPL CALC-MCNC: 7 MG/DL
WBC # SPEC AUTO: 5.13 X10(3)/MCL (ref 4.5–11.5)
WBC #/AREA URNS AUTO: ABNORMAL /HPF

## 2023-11-24 PROCEDURE — 80061 LIPID PANEL: CPT

## 2023-11-24 PROCEDURE — 81001 URINALYSIS AUTO W/SCOPE: CPT

## 2023-11-24 PROCEDURE — 80053 COMPREHEN METABOLIC PANEL: CPT

## 2023-11-24 PROCEDURE — 86140 C-REACTIVE PROTEIN: CPT

## 2023-11-24 PROCEDURE — 84153 ASSAY OF PSA TOTAL: CPT

## 2023-11-24 PROCEDURE — 36415 COLL VENOUS BLD VENIPUNCTURE: CPT

## 2023-11-24 PROCEDURE — 85025 COMPLETE CBC W/AUTO DIFF WBC: CPT

## 2023-11-27 RX ORDER — SERTRALINE HYDROCHLORIDE 100 MG/1
TABLET, FILM COATED ORAL
Qty: 90 TABLET | Refills: 3 | Status: SHIPPED | OUTPATIENT
Start: 2023-11-27 | End: 2023-12-04

## 2023-11-30 ENCOUNTER — TELEPHONE (OUTPATIENT)
Dept: INTERNAL MEDICINE | Facility: CLINIC | Age: 58
End: 2023-11-30
Payer: COMMERCIAL

## 2023-12-01 PROBLEM — M05.9 SEROPOSITIVE RHEUMATOID ARTHRITIS: Chronic | Status: ACTIVE | Noted: 2022-09-09

## 2023-12-01 PROBLEM — Z00.00 WELLNESS EXAMINATION: Status: ACTIVE | Noted: 2023-12-01

## 2023-12-04 ENCOUNTER — OFFICE VISIT (OUTPATIENT)
Dept: INTERNAL MEDICINE | Facility: CLINIC | Age: 58
End: 2023-12-04
Payer: COMMERCIAL

## 2023-12-04 VITALS
DIASTOLIC BLOOD PRESSURE: 88 MMHG | HEIGHT: 70 IN | BODY MASS INDEX: 35.62 KG/M2 | HEART RATE: 92 BPM | OXYGEN SATURATION: 97 % | SYSTOLIC BLOOD PRESSURE: 132 MMHG | WEIGHT: 248.81 LBS

## 2023-12-04 DIAGNOSIS — E66.9 OBESITY, UNSPECIFIED CLASSIFICATION, UNSPECIFIED OBESITY TYPE, UNSPECIFIED WHETHER SERIOUS COMORBIDITY PRESENT: Chronic | ICD-10-CM

## 2023-12-04 DIAGNOSIS — Z23 FLU VACCINE NEED: Primary | ICD-10-CM

## 2023-12-04 DIAGNOSIS — E66.9 OBESITY, UNSPECIFIED CLASSIFICATION, UNSPECIFIED OBESITY TYPE, UNSPECIFIED WHETHER SERIOUS COMORBIDITY PRESENT: ICD-10-CM

## 2023-12-04 DIAGNOSIS — Z00.00 WELLNESS EXAMINATION: ICD-10-CM

## 2023-12-04 DIAGNOSIS — M05.9 SEROPOSITIVE RHEUMATOID ARTHRITIS: Chronic | ICD-10-CM

## 2023-12-04 DIAGNOSIS — N40.0 PROSTATE HYPERTROPHY: ICD-10-CM

## 2023-12-04 PROCEDURE — 1159F MED LIST DOCD IN RCRD: CPT | Mod: CPTII,,, | Performed by: INTERNAL MEDICINE

## 2023-12-04 PROCEDURE — 3008F BODY MASS INDEX DOCD: CPT | Mod: CPTII,,, | Performed by: INTERNAL MEDICINE

## 2023-12-04 PROCEDURE — 3075F PR MOST RECENT SYSTOLIC BLOOD PRESS GE 130-139MM HG: ICD-10-PCS | Mod: CPTII,,, | Performed by: INTERNAL MEDICINE

## 2023-12-04 PROCEDURE — 1160F RVW MEDS BY RX/DR IN RCRD: CPT | Mod: CPTII,,, | Performed by: INTERNAL MEDICINE

## 2023-12-04 PROCEDURE — 99396 PREV VISIT EST AGE 40-64: CPT | Mod: ,,, | Performed by: INTERNAL MEDICINE

## 2023-12-04 PROCEDURE — 1160F PR REVIEW ALL MEDS BY PRESCRIBER/CLIN PHARMACIST DOCUMENTED: ICD-10-PCS | Mod: CPTII,,, | Performed by: INTERNAL MEDICINE

## 2023-12-04 PROCEDURE — 90686 FLU VACCINE (QUAD) GREATER THAN OR EQUAL TO 3YO PRESERVATIVE FREE IM: ICD-10-PCS | Mod: ,,, | Performed by: INTERNAL MEDICINE

## 2023-12-04 PROCEDURE — G0008 FLU VACCINE (QUAD) GREATER THAN OR EQUAL TO 3YO PRESERVATIVE FREE IM: ICD-10-PCS | Mod: ,,, | Performed by: INTERNAL MEDICINE

## 2023-12-04 PROCEDURE — 1159F PR MEDICATION LIST DOCUMENTED IN MEDICAL RECORD: ICD-10-PCS | Mod: CPTII,,, | Performed by: INTERNAL MEDICINE

## 2023-12-04 PROCEDURE — G0008 ADMIN INFLUENZA VIRUS VAC: HCPCS | Mod: ,,, | Performed by: INTERNAL MEDICINE

## 2023-12-04 PROCEDURE — 90686 IIV4 VACC NO PRSV 0.5 ML IM: CPT | Mod: ,,, | Performed by: INTERNAL MEDICINE

## 2023-12-04 PROCEDURE — 3079F PR MOST RECENT DIASTOLIC BLOOD PRESSURE 80-89 MM HG: ICD-10-PCS | Mod: CPTII,,, | Performed by: INTERNAL MEDICINE

## 2023-12-04 PROCEDURE — 3075F SYST BP GE 130 - 139MM HG: CPT | Mod: CPTII,,, | Performed by: INTERNAL MEDICINE

## 2023-12-04 PROCEDURE — 3079F DIAST BP 80-89 MM HG: CPT | Mod: CPTII,,, | Performed by: INTERNAL MEDICINE

## 2023-12-04 PROCEDURE — 3008F PR BODY MASS INDEX (BMI) DOCUMENTED: ICD-10-PCS | Mod: CPTII,,, | Performed by: INTERNAL MEDICINE

## 2023-12-04 PROCEDURE — 99396 PR PREVENTIVE VISIT,EST,40-64: ICD-10-PCS | Mod: ,,, | Performed by: INTERNAL MEDICINE

## 2023-12-04 RX ORDER — PREDNISONE 10 MG/1
1 TABLET ORAL DAILY
COMMUNITY

## 2023-12-04 RX ORDER — SERTRALINE HYDROCHLORIDE 50 MG/1
1 TABLET, FILM COATED ORAL DAILY
COMMUNITY

## 2023-12-04 RX ORDER — TAMSULOSIN HYDROCHLORIDE 0.4 MG/1
0.4 CAPSULE ORAL DAILY
Qty: 90 CAPSULE | Refills: 3 | Status: SHIPPED | OUTPATIENT
Start: 2023-12-04 | End: 2024-12-03

## 2023-12-04 RX ORDER — SEMAGLUTIDE 1 MG/.5ML
1 INJECTION, SOLUTION SUBCUTANEOUS
Qty: 4 ML | Refills: 11 | Status: SHIPPED | OUTPATIENT
Start: 2023-12-04

## 2023-12-04 NOTE — ASSESSMENT & PLAN NOTE
PSA has more than doubled from 1-2.5   He does have prostate hypertrophy and in beginning him on Flomax daily   He will repeat his PSA in 3 months.

## 2023-12-04 NOTE — PROGRESS NOTES
Maynor Wilson MD        PATIENT NAME: Truman Gonzalez  : 1965  DATE: 23  MRN: [id number]      Billing Provider: Maynor Wilson MD  Level of Service: [place] PREVENTIVE VISIT,EST,40-64  Patient PCP Information       Provider PCP Type    Maynor Wilson MD General            Reason for Visit / Chief Complaint: Annual Exam (Wellness/)       Update PCP  Update Chief Complaint         History of Present Illness / Problem Focused Workflow     Truman Gonzalez presents to the clinic with Annual Exam (Wellness/)       Truman is here for his annual wellness exam   He has 2 issues he wants discuss 1st he would like to take something for weight loss and 2nd he has frequent urination.        Review of Systems   Review of Systems   Constitutional: Negative.    HENT: Negative.     Eyes: Negative.    Respiratory: Negative.     Cardiovascular: Negative.    Gastrointestinal: Negative.    Endocrine: Negative.    Genitourinary: Negative.    Musculoskeletal: Negative.    Integumentary:  Negative.   Neurological: Negative.    Psychiatric/Behavioral: Negative.          Medical / Social / Family History     Past Medical History:   Diagnosis Date    Anxiety        Past Surgical History:   Procedure Laterality Date    SPINE SURGERY  2018       Social History  Mr. Gonzalez  reports that he has never smoked. He has never been exposed to tobacco smoke. He has never used smokeless tobacco. He reports current alcohol use of about 10.0 standard drinks of alcohol per week. He reports that he does not use drugs.    Family History  Mr.'s Gonzalez  family history includes Atrial fibrillation in his mother; Colon cancer in his paternal grandfather.    Medications and Allergies     Medications  Outpatient Medications Marked as Taking for the 23 encounter (Office Visit) with Maynor Wilson MD   Medication Sig Dispense Refill    ALPRAZolam (XANAX) 0.25 MG tablet Take 0.25 mg by mouth nightly as needed for Anxiety.       diclofenac sodium (VOLTAREN) 1 % Gel Apply 2 g topically 3 (three) times daily as needed (pain). 100 g 5    folic acid (FOLVITE) 1 MG tablet Take 1 tablet (1,000 mcg total) by mouth once daily. 90 tablet 3    hydroxychloroquine (PLAQUENIL) 200 mg tablet Take 1 tablet (200 mg total) by mouth 2 (two) times daily. 180 tablet 3    methotrexate 2.5 MG Tab Take 8 tablets (20 mg total) by mouth every 7 days. Every Thursday take 4 tab at lunch and 4 tab at supper 100 tablet 3    omega-3 fatty acids/fish oil (FISH OIL-OMEGA-3 FATTY ACIDS) 300-1,000 mg capsule Take 1 capsule by mouth once daily.      predniSONE (DELTASONE) 10 MG tablet Take 1 tablet by mouth once daily.      sertraline (ZOLOFT) 50 MG tablet Take 1 tablet by mouth once daily.      tiZANidine (ZANAFLEX) 4 MG tablet Take 1 tablet (4 mg total) by mouth nightly. 90 tablet 3    traMADoL (ULTRAM) 50 mg tablet Take 50 mg by mouth daily as needed.      [DISCONTINUED] methylPREDNISolone (MEDROL DOSEPACK) 4 mg tablet use as directed 21 each 0    [DISCONTINUED] predniSONE (DELTASONE) 5 MG tablet Take 1 tablet (5 mg total) by mouth daily as needed (as needed for flare ups for  three consecutives days). AFTER BREAKFAST 90 tablet 3    [DISCONTINUED] sertraline (ZOLOFT) 100 MG tablet TAKE 1 TABLET DAILY 90 tablet 3       Allergies  Review of patient's allergies indicates:  No Known Allergies    Physical Examination     Vitals:    12/04/23 1343   BP: 132/88   Pulse: 92     Physical Exam  Vitals reviewed.   Constitutional:       Appearance: Normal appearance.   HENT:      Head: Normocephalic.      Right Ear: Tympanic membrane normal.      Left Ear: Tympanic membrane normal.      Nose: Nose normal.      Mouth/Throat:      Pharynx: Oropharynx is clear.   Eyes:      Extraocular Movements: Extraocular movements intact.      Pupils: Pupils are equal, round, and reactive to light.   Cardiovascular:      Rate and Rhythm: Normal rate and regular rhythm.      Pulses: Normal pulses.       Heart sounds: Normal heart sounds.   Pulmonary:      Effort: Pulmonary effort is normal.      Breath sounds: Normal breath sounds.   Abdominal:      General: Abdomen is flat. Bowel sounds are normal.      Palpations: Abdomen is soft.   Genitourinary:     Testes: Normal.      Prostate: Normal.      Rectum: Normal.      Comments:   Prostate is enlarged 20-30%  Musculoskeletal:         General: Normal range of motion.      Cervical back: Normal range of motion.   Skin:     General: Skin is warm and dry.   Neurological:      General: No focal deficit present.      Mental Status: He is alert and oriented to person, place, and time.   Psychiatric:         Mood and Affect: Mood normal.         Behavior: Behavior normal.          Assessment and Plan (including Health Maintenance)      Problem List  Smart Sets  Document Outside HM   :    Plan:    ICD-10-CM ICD-9-CM   1. Wellness examination  Z00.00 V70.0   2. Seropositive rheumatoid arthritis  M05.9 714.0   3. Prostate hypertrophy  N40.0 600.90   4. Obesity, unspecified classification, unspecified obesity type, unspecified whether serious comorbidity present  E66.9 278.00       Problem List Items Addressed This Visit          Renal/    Prostate hypertrophy (Chronic)      PSA has more than doubled from 1-2.5   He does have prostate hypertrophy and in beginning him on Flomax daily   He will repeat his PSA in 3 months.            Immunology/Multi System    Seropositive rheumatoid arthritis (Chronic)       Endocrine    Obesity (Chronic)       He would like to try something for his obesity plans with the prescribe Wegovy weekly injections.            Other    Wellness examination - Primary       Reviewed all laboratory reports with him                   Health Maintenance Due   Topic Date Due    Hepatitis C Screening  Never done    Pneumococcal Vaccines (Age 0-64) (1 - PCV) Never done    HIV Screening  Never done    Shingles Vaccine (1 of 2) Never done    Influenza Vaccine (1)  09/01/2023    COVID-19 Vaccine (5 - 2023-24 season) 09/01/2023       Problem List Items Addressed This Visit          Renal/    Prostate hypertrophy (Chronic)    Current Assessment & Plan      PSA has more than doubled from 1-2.5   He does have prostate hypertrophy and in beginning him on Flomax daily   He will repeat his PSA in 3 months.            Immunology/Multi System    Seropositive rheumatoid arthritis (Chronic)       Endocrine    Obesity (Chronic)    Current Assessment & Plan       He would like to try something for his obesity plans with the prescribe Wegovy weekly injections.            Other    Wellness examination - Primary    Current Assessment & Plan       Reviewed all laboratory reports with him            Health Maintenance Topics with due status: Not Due       Topic Last Completion Date    Colorectal Cancer Screening 07/07/2017    TETANUS VACCINE 09/19/2022    Hemoglobin A1c (Diabetic Prevention Screening) 10/31/2022    Lipid Panel 11/24/2023       No future appointments.         Signature:  Maynor Marie MD  [facility]  [address] [place] [address]  [place] [id number]    Date of encounter: 12/4/23

## 2023-12-04 NOTE — ASSESSMENT & PLAN NOTE
He would like to try something for his obesity plans with the prescribe Wegovy weekly injections.

## 2023-12-11 ENCOUNTER — TELEPHONE (OUTPATIENT)
Dept: INTERNAL MEDICINE | Facility: CLINIC | Age: 58
End: 2023-12-11
Payer: COMMERCIAL

## 2023-12-11 NOTE — TELEPHONE ENCOUNTER
----- Message from Amber Zee sent at 12/11/2023  1:11 PM CST -----  .Type:  Patient Returning Call    Who Called:pt' spouse Cassia   Who Left Message for Patient:  Does the patient know what this is regarding?:medication   Would the patient rather a call back or a response via [facility]? Call back   Best Call Back Number:[phone]  Additional Information: Pt's wife states that the pt's medication semaglutide, weight loss, (WEGOVY) 1 mg/0.5 mL PnIj out of stock indefinitely at the pharmacy. Is there something else that the pt can be put on?

## 2023-12-11 NOTE — TELEPHONE ENCOUNTER
Pt wife called and stated that semaglitude is out of stock everywhere and she's requesting an alternative medication if possible. Please Advise...

## 2023-12-13 ENCOUNTER — TELEPHONE (OUTPATIENT)
Dept: INTERNAL MEDICINE | Facility: CLINIC | Age: 58
End: 2023-12-13
Payer: COMMERCIAL

## 2023-12-13 DIAGNOSIS — E66.9 OBESITY, UNSPECIFIED CLASSIFICATION, UNSPECIFIED OBESITY TYPE, UNSPECIFIED WHETHER SERIOUS COMORBIDITY PRESENT: Primary | ICD-10-CM

## 2023-12-13 DIAGNOSIS — E66.9 OBESITY, UNSPECIFIED CLASSIFICATION, UNSPECIFIED OBESITY TYPE, UNSPECIFIED WHETHER SERIOUS COMORBIDITY PRESENT: ICD-10-CM

## 2023-12-13 RX ORDER — TIRZEPATIDE 2.5 MG/.5ML
2.5 INJECTION, SOLUTION SUBCUTANEOUS
Qty: 4 PEN | Refills: 0 | Status: SHIPPED | OUTPATIENT
Start: 2023-12-13 | End: 2023-12-13

## 2023-12-13 RX ORDER — TIRZEPATIDE 2.5 MG/.5ML
INJECTION, SOLUTION SUBCUTANEOUS
Qty: 4 PEN | Refills: 0 | Status: SHIPPED | OUTPATIENT
Start: 2023-12-13 | End: 2024-03-25 | Stop reason: SDUPTHER

## 2023-12-13 NOTE — TELEPHONE ENCOUNTER
Mounjaro 2.5 mg x 4 weeks then increase to 5 mg after. Rx faxed in to pharmacy and left message for wife.

## 2023-12-13 NOTE — TELEPHONE ENCOUNTER
----- Message from Olga Lidia Pike sent at 12/13/2023  7:53 AM CST -----  Regarding: return call  Type:  Patient Returning Call    Who Called:pt's wife saranya  Who Left Message for Patient:  Does the patient know what this is regarding?:  Would the patient rather a call back or a response via [facility]? C/b  Best Call Back Number:[phone]  Additional Information:

## 2024-03-04 PROBLEM — Z00.00 WELLNESS EXAMINATION: Status: RESOLVED | Noted: 2023-12-01 | Resolved: 2024-03-04

## 2024-03-25 ENCOUNTER — TELEPHONE (OUTPATIENT)
Dept: INTERNAL MEDICINE | Facility: CLINIC | Age: 59
End: 2024-03-25
Payer: COMMERCIAL

## 2024-03-25 DIAGNOSIS — E66.9 OBESITY, UNSPECIFIED CLASSIFICATION, UNSPECIFIED OBESITY TYPE, UNSPECIFIED WHETHER SERIOUS COMORBIDITY PRESENT: ICD-10-CM

## 2024-03-25 RX ORDER — TIRZEPATIDE 2.5 MG/.5ML
INJECTION, SOLUTION SUBCUTANEOUS
Qty: 4 PEN | Refills: 3 | Status: SHIPPED | OUTPATIENT
Start: 2024-03-25 | End: 2024-04-22 | Stop reason: SDUPTHER

## 2024-03-25 NOTE — TELEPHONE ENCOUNTER
----- Message from Olga Lidia Pike sent at 3/25/2024 11:41 AM CDT -----  Regarding: refill  Type:  RX Refill Request    Who Called: pt's wife saranya  Refill or New Rx:refill  RX Name and Strength:tirzepatide (MOUNJARO) 2.5 mg/0.5 mL PnIj  How is the patient currently taking it? (ex. 1XDay):1 week  Is this a 30 day or 90 day RX:90  Preferred Pharmacy with phone number:[facility]  Local or Mail Order:mail order  Ordering Provider:dr mars  Would the patient rather a call back or a response via [facility]? C/b  Best Call Back Number:[phone]  Additional Information:

## 2024-04-22 ENCOUNTER — TELEPHONE (OUTPATIENT)
Dept: INTERNAL MEDICINE | Facility: CLINIC | Age: 59
End: 2024-04-22
Payer: COMMERCIAL

## 2024-04-22 DIAGNOSIS — E66.9 OBESITY, UNSPECIFIED CLASSIFICATION, UNSPECIFIED OBESITY TYPE, UNSPECIFIED WHETHER SERIOUS COMORBIDITY PRESENT: Primary | ICD-10-CM

## 2024-04-22 RX ORDER — TIRZEPATIDE 2.5 MG/.5ML
INJECTION, SOLUTION SUBCUTANEOUS
Qty: 12 PEN | Refills: 3 | Status: SHIPPED | OUTPATIENT
Start: 2024-04-22 | End: 2024-06-13

## 2024-04-22 NOTE — TELEPHONE ENCOUNTER
----- Message from Karishma Bowen sent at 4/22/2024  9:42 AM CDT -----  .Type:  Patient Returning Call    Who Called:Cassia  Who Left Message for Patient:Wife  Does the patient know what this is regarding?:tirzepatide (MOUNJARO) 2.5 mg/0.5 mL PnIj  Would the patient rather a call back or a response via [facility]?   Best Call Back Number:[phone]  Additional Information: Please send proscription for 90 days [facility] [place] tirzepatide (MOUNJARO) 2.5 mg/0.5 mL PnIj

## 2024-04-23 ENCOUNTER — TELEPHONE (OUTPATIENT)
Dept: INTERNAL MEDICINE | Facility: CLINIC | Age: 59
End: 2024-04-23
Payer: COMMERCIAL

## 2024-04-23 NOTE — TELEPHONE ENCOUNTER
----- Message from Shefali Brown sent at 4/23/2024  8:48 AM CDT -----  Regarding: status of PA  Type:  Needs Medical Advice    Who Called: Cassia    Would the patient rather a call back or a response via [facility]?     Best Call Back Number: [phone]    Additional Information: Cassia is calling to check on the status of a PA for the mounjaro. She is requesting a call back

## 2024-06-13 ENCOUNTER — TELEPHONE (OUTPATIENT)
Dept: INTERNAL MEDICINE | Facility: CLINIC | Age: 59
End: 2024-06-13
Payer: COMMERCIAL

## 2024-06-13 DIAGNOSIS — E66.9 OBESITY, UNSPECIFIED CLASSIFICATION, UNSPECIFIED OBESITY TYPE, UNSPECIFIED WHETHER SERIOUS COMORBIDITY PRESENT: ICD-10-CM

## 2024-06-13 RX ORDER — TIRZEPATIDE 2.5 MG/.5ML
INJECTION, SOLUTION SUBCUTANEOUS
Qty: 4 PEN | Refills: 3 | Status: SHIPPED | OUTPATIENT
Start: 2024-06-13 | End: 2024-06-13 | Stop reason: SDUPTHER

## 2024-06-13 RX ORDER — TIRZEPATIDE 2.5 MG/.5ML
2.5 INJECTION, SOLUTION SUBCUTANEOUS WEEKLY
Qty: 2 ML | Refills: 3 | Status: SHIPPED | OUTPATIENT
Start: 2024-06-13

## 2024-06-13 RX ORDER — TIRZEPATIDE 2.5 MG/.5ML
INJECTION, SOLUTION SUBCUTANEOUS
Qty: 4 PEN | Refills: 3 | Status: SHIPPED | OUTPATIENT
Start: 2024-06-13 | End: 2024-06-13

## 2024-06-13 RX ORDER — TIRZEPATIDE 2.5 MG/.5ML
INJECTION, SOLUTION SUBCUTANEOUS
Qty: 12 PEN | Refills: 3 | Status: SHIPPED | OUTPATIENT
Start: 2024-06-13

## 2024-06-13 NOTE — TELEPHONE ENCOUNTER
----- Message from Amber Zee sent at 6/13/2024 10:55 AM CDT -----  .Type:  Patient Returning Call    Who Called:pt's spouse saranya   Who Left Message for Patient:  Does the patient know what this is regarding?:calling to have the tirzepatide (MOUNJARO) 2.5 mg/0.5 mL PnIj switched to [facility]   Would the patient rather a call back or a response via [facility]? Call back   Best Call Back Number:[phone]  Additional Information:

## 2024-08-22 ENCOUNTER — TELEPHONE (OUTPATIENT)
Dept: INTERNAL MEDICINE | Facility: CLINIC | Age: 59
End: 2024-08-22
Payer: COMMERCIAL

## 2024-08-22 PROBLEM — M54.2 NONSPECIFIC PAIN IN THE NECK REGION: Status: ACTIVE | Noted: 2024-08-22

## 2024-08-22 NOTE — TELEPHONE ENCOUNTER
----- Message from Atilio Estrada sent at 8/22/2024  8:30 AM CDT -----  Regarding: return call  Who Called: Truman Gonzalez    Patient is returning phone call    Who Left Message for Patient:Jhoana  Does the patient know what this is regarding?:      Preferred Method of Contact: Phone Call  Patient's Preferred Phone Number on File: [phone]   Best Call Back Number, if different:  Additional Information: Pt states he has a missed call  
----- Message from Jeanne Perez sent at 8/22/2024  7:55 AM CDT -----  Regarding: Med advice  .Who Called: Truman Gonzalez    Caller is requesting assistance/information from provider's office.    Symptoms (please be specific): neck pain   How long has patient had these symptoms:  2 days   List of preferred pharmacies on file (remove unneeded): [unfilled]  If different, enter pharmacy into here including location and phone number: [facility]       Preferred Method of Contact: Phone Call  Patient's Preferred Phone Number on File: [phone]  Best Call Back Number, if different:  Additional Information: pt is wanting advice about the neck pain that he's having that's causing  a lot of headaches, would like to know should he see an orthopedic, please advise  
----- Message from Jeanne Perez sent at 8/22/2024  7:55 AM CDT -----  Regarding: Med advice  .Who Called: Truman Gonzalez    Caller is requesting assistance/information from provider's office.    Symptoms (please be specific): neck pain   How long has patient had these symptoms:  2 days   List of preferred pharmacies on file (remove unneeded): [unfilled]  If different, enter pharmacy into here including location and phone number: [facility]       Preferred Method of Contact: Phone Call  Patient's Preferred Phone Number on File: [phone]  Best Call Back Number, if different:  Additional Information: pt is wanting advice about the neck pain that he's having that's causing  a lot of headaches, would like to know should he see an orthopedic, please advise  
Left message for patient to call back to make appointment for evaluation and possible referral  
Spoke with patient appt scheduled for 8/23/24 at 8:00  
complains of pain/discomfort

## 2024-10-03 ENCOUNTER — TELEPHONE (OUTPATIENT)
Dept: INTERNAL MEDICINE | Facility: CLINIC | Age: 59
End: 2024-10-03
Payer: COMMERCIAL

## 2024-10-03 NOTE — TELEPHONE ENCOUNTER
Patient has an appointment on 10/9  No labs needed, will send to [facility] if we need labs

## 2024-10-08 ENCOUNTER — OFFICE VISIT (OUTPATIENT)
Dept: INTERNAL MEDICINE | Facility: CLINIC | Age: 59
End: 2024-10-08
Payer: COMMERCIAL

## 2024-10-08 VITALS
DIASTOLIC BLOOD PRESSURE: 80 MMHG | SYSTOLIC BLOOD PRESSURE: 100 MMHG | RESPIRATION RATE: 18 BRPM | WEIGHT: 207.19 LBS | HEIGHT: 70 IN | OXYGEN SATURATION: 97 % | HEART RATE: 67 BPM | BODY MASS INDEX: 29.66 KG/M2

## 2024-10-08 DIAGNOSIS — M05.9 SEROPOSITIVE RHEUMATOID ARTHRITIS: Chronic | ICD-10-CM

## 2024-10-08 DIAGNOSIS — N40.0 PROSTATE HYPERTROPHY: Chronic | ICD-10-CM

## 2024-10-08 DIAGNOSIS — Z23 FLU VACCINE NEED: ICD-10-CM

## 2024-10-08 DIAGNOSIS — E66.09 OBESITY DUE TO EXCESS CALORIES IN PEDIATRIC PATIENT, UNSPECIFIED BMI, UNSPECIFIED WHETHER SERIOUS COMORBIDITY PRESENT: Primary | Chronic | ICD-10-CM

## 2024-10-08 RX ORDER — TIRZEPATIDE 5 MG/.5ML
5 INJECTION, SOLUTION SUBCUTANEOUS
Qty: 12 PEN | Refills: 3 | Status: SHIPPED | OUTPATIENT
Start: 2024-10-08

## 2024-10-08 NOTE — PROGRESS NOTES
Maynor Wilson MD        PATIENT NAME: Truman Gonzalez  : 1965  DATE: 10/8/24  MRN: [id number]      Billing Provider: Maynor Wilson MD  Level of Service: [place] OFFICE/OUTPT VISIT, EST, LEVL IV, 30-39 MIN  Patient PCP Information       Provider PCP Type    Maynor Wilson MD General            Reason for Visit / Chief Complaint: Med Change Follow Up       Update PCP  Update Chief Complaint         History of Present Illness / Problem Focused Workflow     Truman Gonzalez presents to the clinic with Med Change Follow Up     Truman is here follow-up his obesity issues and taking Mounjaro as well as urinary issues with frequency and unable to empty his bladder.        Review of Systems   Review of Systems   Constitutional: Negative.    HENT: Negative.     Eyes: Negative.    Respiratory: Negative.     Cardiovascular: Negative.    Gastrointestinal: Negative.    Endocrine: Negative.    Genitourinary: Negative.    Musculoskeletal: Negative.    Integumentary:  Negative.   Neurological: Negative.    Psychiatric/Behavioral: Negative.          Medical / Social / Family History     Past Medical History:   Diagnosis Date    Anxiety        Past Surgical History:   Procedure Laterality Date    SPINE SURGERY  2018       Social History  Mr. Gonzalez  reports that he has never smoked. He has never been exposed to tobacco smoke. He has never used smokeless tobacco. He reports current alcohol use of about 10.0 standard drinks of alcohol per week. He reports that he does not use drugs.    Family History  Mr.'s Gonzalez  family history includes Atrial fibrillation in his mother; Colon cancer in his paternal grandfather.    Medications and Allergies     Medications  Outpatient Medications Marked as Taking for the 10/8/24 encounter (Office Visit) with Maynor Wilson MD   Medication Sig Dispense Refill    ALPRAZolam (XANAX) 0.25 MG tablet Take 0.25 mg by mouth nightly as needed for Anxiety.      diclofenac sodium  (VOLTAREN) 1 % Gel Apply 2 g topically 3 (three) times daily as needed (pain). 100 g 5    folic acid (FOLVITE) 1 MG tablet Take 1 tablet (1,000 mcg total) by mouth once daily. 90 tablet 3    hydroxychloroquine (PLAQUENIL) 200 mg tablet Take 1 tablet (200 mg total) by mouth 2 (two) times daily. 180 tablet 3    methotrexate 2.5 MG Tab Take 8 tablets (20 mg total) by mouth every 7 days. Every Thursday take 4 tab at lunch and 4 tab at supper 100 tablet 3    omega-3 fatty acids/fish oil (FISH OIL-OMEGA-3 FATTY ACIDS) 300-1,000 mg capsule Take 1 capsule by mouth once daily.      predniSONE (DELTASONE) 10 MG tablet Take 1 tablet by mouth once daily.      sertraline (ZOLOFT) 50 MG tablet Take 1 tablet by mouth once daily.      tamsulosin (FLOMAX) 0.4 mg Cap Take 1 capsule (0.4 mg total) by mouth once daily. 90 capsule 3    tirzepatide (MOUNJARO) 2.5 mg/0.5 mL PnIj INJECT 2.5 MG SUBCUTANEOUSLY EVERY 7 DAYS 12 Pen 3    tiZANidine (ZANAFLEX) 4 MG tablet Take 1 tablet (4 mg total) by mouth nightly. 90 tablet 3    traMADoL (ULTRAM) 50 mg tablet Take 50 mg by mouth daily as needed.         Allergies  Review of patient's allergies indicates:  No Known Allergies    Physical Examination     Vitals:    10/08/24 0939   BP: 100/80   Pulse: 67   Resp: 18     Physical Exam  Vitals reviewed.   Constitutional:       Appearance: Normal appearance.   HENT:      Head: Normocephalic.      Right Ear: Tympanic membrane normal.      Left Ear: Tympanic membrane normal.      Nose: Nose normal.      Mouth/Throat:      Pharynx: Oropharynx is clear.   Eyes:      Extraocular Movements: Extraocular movements intact.      Pupils: Pupils are equal, round, and reactive to light.   Cardiovascular:      Rate and Rhythm: Normal rate and regular rhythm.      Pulses: Normal pulses.      Heart sounds: Normal heart sounds.   Pulmonary:      Effort: Pulmonary effort is normal.      Breath sounds: Normal breath sounds.   Abdominal:      General: Abdomen is flat.  Bowel sounds are normal.      Palpations: Abdomen is soft.   Musculoskeletal:         General: Normal range of motion.      Cervical back: Normal range of motion.   Skin:     General: Skin is warm and dry.   Neurological:      General: No focal deficit present.      Mental Status: He is alert and oriented to person, place, and time.   Psychiatric:         Mood and Affect: Mood normal.         Behavior: Behavior normal.          Assessment and Plan (including Health Maintenance)      Problem List  Smart Sets  Document Outside HM   :    Plan:    ICD-10-CM ICD-9-CM   1. Obesity due to excess calories in pediatric patient, unspecified BMI, unspecified whether serious comorbidity present  E66.09 278.00   2. Seropositive rheumatoid arthritis  M05.9 714.0   3. Prostate hypertrophy  N40.0 600.90       Problem List Items Addressed This Visit          Renal/    Prostate hypertrophy (Chronic)     He is due for his annual wellness in his PSA next month   Plans to send him to Urology for further evaluation            Immunology/Multi System    Seropositive rheumatoid arthritis (Chronic)       Endocrine    Obesity - Primary (Chronic)     Having a really good response with Mounjaro   Plans to increase him to 5 mg weekly.                   Health Maintenance Due   Topic Date Due    HIV Screening  Never done    Shingles Vaccine (1 of 2) Never done    Influenza Vaccine (1) 09/01/2024    COVID-19 Vaccine (5 - 2024-25 season) 09/01/2024       Problem List Items Addressed This Visit          Renal/    Prostate hypertrophy (Chronic)    Current Assessment & Plan     He is due for his annual wellness in his PSA next month   Plans to send him to Urology for further evaluation            Immunology/Multi System    Seropositive rheumatoid arthritis (Chronic)       Endocrine    Obesity - Primary (Chronic)    Current Assessment & Plan     Having a really good response with Mounjaro   Plans to increase him to 5 mg weekly.            Health  Maintenance Topics with due status: Not Due       Topic Last Completion Date    Colorectal Cancer Screening 07/07/2017    TETANUS VACCINE 09/19/2022    Hemoglobin A1c (Diabetic Prevention Screening) 10/31/2022    Lipid Panel 11/24/2023    RSV Vaccine (Age 60+ and Pregnant patients) Not Due       Future Appointments   Date Time Provider Department Center   12/6/2024  9:20 AM Maynor Wilson MD [place] [address]            Signature:  Maynor Wilson MD  [facility]  [address] [place] [id number]    Date of encounter: 10/8/24
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
## 2024-10-14 ENCOUNTER — TELEPHONE (OUTPATIENT)
Dept: INTERNAL MEDICINE | Facility: CLINIC | Age: 59
End: 2024-10-14
Payer: COMMERCIAL

## 2024-10-14 NOTE — TELEPHONE ENCOUNTER
Pt states that he think his wife called the wrong dr office. He states that he will call her to confirm and call back if he has any questions

## 2024-10-14 NOTE — TELEPHONE ENCOUNTER
----- Message from Sid sent at 10/14/2024 11:18 AM CDT -----  .Who Called: Cassia Gonzalez    Does the patient already have the specialty appointment scheduled?:no  If yes, what is the date of that appointment?:  Referral to What Specialty:Rheumatology   Reason for Referral:  Does the patient want the referral with a specific physician?:Yes  If yes, which provider?: She Mendoza MD [facility] phone: [phone] Fax: [phone]  Is the specialist an [facility]?: [facility]     Preferred Method of Contact: Phone Call  Patient's Preferred Phone Number on File: [phone]   Best Call Back Number, if different:  Additional Information:

## 2024-10-22 ENCOUNTER — PATIENT OUTREACH (OUTPATIENT)
Facility: CLINIC | Age: 59
End: 2024-10-22
Payer: COMMERCIAL

## 2024-10-22 LAB
LEFT EYE DM RETINOPATHY: NEGATIVE
RIGHT EYE DM RETINOPATHY: NEGATIVE

## 2024-10-22 NOTE — PROGRESS NOTES
Records Received, hyper-linked into chart at this time. The following record(s)  below were uploaded for Health Maintenance .             10/22/2024 DM EYE EXAM

## 2024-10-23 ENCOUNTER — TELEPHONE (OUTPATIENT)
Dept: INTERNAL MEDICINE | Facility: CLINIC | Age: 59
End: 2024-10-23
Payer: COMMERCIAL

## 2024-10-23 DIAGNOSIS — M05.9 SEROPOSITIVE RHEUMATOID ARTHRITIS: Primary | ICD-10-CM

## 2024-10-23 NOTE — TELEPHONE ENCOUNTER
----- Message from Unveil sent at 10/22/2024  2:00 PM CDT -----  .Type:  Patient Returning Call    Who Called:Cassia  Who Left Message for Patient:wife  Does the patient know what this is regarding?:She Mendoza MD  Would the patient rather a call back or a response via [facility]?   Best Call Back Number:[phone]  Additional Information: Please call back about referral to She Mendoza MD
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
## 2024-10-28 DIAGNOSIS — N40.0 PROSTATE HYPERTROPHY: Primary | ICD-10-CM

## 2024-10-28 RX ORDER — TAMSULOSIN HYDROCHLORIDE 0.4 MG/1
1 CAPSULE ORAL
Qty: 90 CAPSULE | Refills: 3 | Status: SHIPPED | OUTPATIENT
Start: 2024-10-28

## 2024-11-27 ENCOUNTER — LAB VISIT (OUTPATIENT)
Dept: LAB | Facility: HOSPITAL | Age: 59
End: 2024-11-27
Attending: INTERNAL MEDICINE
Payer: COMMERCIAL

## 2024-11-27 DIAGNOSIS — Z00.00 WELLNESS EXAMINATION: ICD-10-CM

## 2024-11-27 LAB
25(OH)D3+25(OH)D2 SERPL-MCNC: 38 NG/ML (ref 30–80)
ALBUMIN SERPL-MCNC: 4.2 G/DL (ref 3.5–5)
ALBUMIN/GLOB SERPL: 1.8 RATIO (ref 1.1–2)
ALP SERPL-CCNC: 93 UNIT/L (ref 40–150)
ALT SERPL-CCNC: 48 UNIT/L (ref 0–55)
ANION GAP SERPL CALC-SCNC: 4 MEQ/L
AST SERPL-CCNC: 31 UNIT/L (ref 5–34)
BASOPHILS # BLD AUTO: 0.02 X10(3)/MCL
BASOPHILS NFR BLD AUTO: 0.4 %
BILIRUB SERPL-MCNC: 0.6 MG/DL
BUN SERPL-MCNC: 16.5 MG/DL (ref 8.4–25.7)
CALCIUM SERPL-MCNC: 9.4 MG/DL (ref 8.4–10.2)
CHLORIDE SERPL-SCNC: 108 MMOL/L (ref 98–107)
CHOLEST SERPL-MCNC: 157 MG/DL
CHOLEST/HDLC SERPL: 3 {RATIO} (ref 0–5)
CO2 SERPL-SCNC: 28 MMOL/L (ref 22–29)
CREAT SERPL-MCNC: 0.8 MG/DL (ref 0.72–1.25)
CREAT/UREA NIT SERPL: 21
EOSINOPHIL # BLD AUTO: 0.07 X10(3)/MCL (ref 0–0.9)
EOSINOPHIL NFR BLD AUTO: 1.5 %
ERYTHROCYTE [DISTWIDTH] IN BLOOD BY AUTOMATED COUNT: 13.3 % (ref 11.5–17)
GFR SERPLBLD CREATININE-BSD FMLA CKD-EPI: >60 ML/MIN/1.73/M2
GLOBULIN SER-MCNC: 2.3 GM/DL (ref 2.4–3.5)
GLUCOSE SERPL-MCNC: 87 MG/DL (ref 74–100)
HCT VFR BLD AUTO: 43.1 % (ref 42–52)
HDLC SERPL-MCNC: 56 MG/DL (ref 35–60)
HGB BLD-MCNC: 14.1 G/DL (ref 14–18)
IMM GRANULOCYTES # BLD AUTO: 0.01 X10(3)/MCL (ref 0–0.04)
IMM GRANULOCYTES NFR BLD AUTO: 0.2 %
LDLC SERPL CALC-MCNC: 91 MG/DL (ref 50–140)
LYMPHOCYTES # BLD AUTO: 1.4 X10(3)/MCL (ref 0.6–4.6)
LYMPHOCYTES NFR BLD AUTO: 29.7 %
MCH RBC QN AUTO: 29.6 PG (ref 27–31)
MCHC RBC AUTO-ENTMCNC: 32.7 G/DL (ref 33–36)
MCV RBC AUTO: 90.5 FL (ref 80–94)
MONOCYTES # BLD AUTO: 0.42 X10(3)/MCL (ref 0.1–1.3)
MONOCYTES NFR BLD AUTO: 8.9 %
NEUTROPHILS # BLD AUTO: 2.8 X10(3)/MCL (ref 2.1–9.2)
NEUTROPHILS NFR BLD AUTO: 59.3 %
NRBC BLD AUTO-RTO: 0 %
PLATELET # BLD AUTO: 236 X10(3)/MCL (ref 130–400)
PMV BLD AUTO: 9.7 FL (ref 7.4–10.4)
POTASSIUM SERPL-SCNC: 4.7 MMOL/L (ref 3.5–5.1)
PROT SERPL-MCNC: 6.5 GM/DL (ref 6.4–8.3)
PSA SERPL-MCNC: 2.08 NG/ML
RBC # BLD AUTO: 4.76 X10(6)/MCL (ref 4.7–6.1)
SODIUM SERPL-SCNC: 140 MMOL/L (ref 136–145)
TRIGL SERPL-MCNC: 51 MG/DL (ref 34–140)
VLDLC SERPL CALC-MCNC: 10 MG/DL
WBC # BLD AUTO: 4.72 X10(3)/MCL (ref 4.5–11.5)

## 2024-11-27 PROCEDURE — 85025 COMPLETE CBC W/AUTO DIFF WBC: CPT

## 2024-11-27 PROCEDURE — 82306 VITAMIN D 25 HYDROXY: CPT

## 2024-11-27 PROCEDURE — 84153 ASSAY OF PSA TOTAL: CPT

## 2024-11-27 PROCEDURE — 36415 COLL VENOUS BLD VENIPUNCTURE: CPT

## 2024-11-27 PROCEDURE — 80053 COMPREHEN METABOLIC PANEL: CPT

## 2024-11-27 PROCEDURE — 80061 LIPID PANEL: CPT

## 2024-12-03 ENCOUNTER — TELEPHONE (OUTPATIENT)
Dept: INTERNAL MEDICINE | Facility: CLINIC | Age: 59
End: 2024-12-03
Payer: COMMERCIAL

## 2024-12-03 NOTE — TELEPHONE ENCOUNTER
Patient has an appointment on 12/10  Fasting labs DONE  Please call and remind patient of appointment

## 2024-12-10 ENCOUNTER — OFFICE VISIT (OUTPATIENT)
Dept: INTERNAL MEDICINE | Facility: CLINIC | Age: 59
End: 2024-12-10
Payer: COMMERCIAL

## 2024-12-10 VITALS
SYSTOLIC BLOOD PRESSURE: 110 MMHG | HEART RATE: 73 BPM | OXYGEN SATURATION: 99 % | BODY MASS INDEX: 28.77 KG/M2 | RESPIRATION RATE: 18 BRPM | HEIGHT: 70 IN | DIASTOLIC BLOOD PRESSURE: 80 MMHG | WEIGHT: 201 LBS

## 2024-12-10 DIAGNOSIS — Z00.00 WELLNESS EXAMINATION: Primary | ICD-10-CM

## 2024-12-10 DIAGNOSIS — M05.9 SEROPOSITIVE RHEUMATOID ARTHRITIS: Chronic | ICD-10-CM

## 2024-12-10 PROCEDURE — 3074F SYST BP LT 130 MM HG: CPT | Mod: CPTII,,, | Performed by: INTERNAL MEDICINE

## 2024-12-10 PROCEDURE — 1160F RVW MEDS BY RX/DR IN RCRD: CPT | Mod: CPTII,,, | Performed by: INTERNAL MEDICINE

## 2024-12-10 PROCEDURE — 99396 PREV VISIT EST AGE 40-64: CPT | Mod: ,,, | Performed by: INTERNAL MEDICINE

## 2024-12-10 PROCEDURE — 3079F DIAST BP 80-89 MM HG: CPT | Mod: CPTII,,, | Performed by: INTERNAL MEDICINE

## 2024-12-10 PROCEDURE — 3008F BODY MASS INDEX DOCD: CPT | Mod: CPTII,,, | Performed by: INTERNAL MEDICINE

## 2024-12-10 PROCEDURE — 1159F MED LIST DOCD IN RCRD: CPT | Mod: CPTII,,, | Performed by: INTERNAL MEDICINE

## 2024-12-10 RX ORDER — OMEPRAZOLE 20 MG/1
20 CAPSULE, DELAYED RELEASE ORAL DAILY
COMMUNITY

## 2024-12-10 NOTE — ASSESSMENT & PLAN NOTE
Discussed results of laboratory examination   PSA low normal   He is having a urologic procedure next week with his urologist.

## 2024-12-10 NOTE — PROGRESS NOTES
Maynor Wilson MD        PATIENT NAME: Truman Gonzalez  : 1965  DATE: 12/10/24  MRN: [id number]      Billing Provider: Maynor Wilson MD  Level of Service: [place] PREVENTIVE VISIT,EST,40-64  Patient PCP Information       Provider PCP Type    Maynor Wilson MD General            Reason for Visit / Chief Complaint: Annual Exam       Update PCP  Update Chief Complaint         History of Present Illness / Problem Focused Workflow     Truman Gonzalez presents to the clinic with Annual Exam     Truman is here for his annual wellness exam   He is doing very well he has lost 50 lb in his no issues        Review of Systems   Review of Systems   Constitutional: Negative.    HENT: Negative.     Eyes: Negative.    Respiratory: Negative.     Cardiovascular: Negative.    Gastrointestinal: Negative.    Endocrine: Negative.    Genitourinary: Negative.    Musculoskeletal: Negative.    Integumentary:  Negative.   Neurological: Negative.    Psychiatric/Behavioral: Negative.          Medical / Social / Family History   History reviewed. No pertinent past medical history.    Past Surgical History:   Procedure Laterality Date    SPINE SURGERY  2018       Social History  Mr. Gonzalez  reports that he has never smoked. He has never been exposed to tobacco smoke. He has never used smokeless tobacco. He reports current alcohol use of about 10.0 standard drinks of alcohol per week. He reports that he does not use drugs.    Family History  Mr.'s Gnozalez  family history includes Atrial fibrillation in his mother; Colon cancer in his paternal grandfather.    Medications and Allergies     Medications  Outpatient Medications Marked as Taking for the 12/10/24 encounter (Office Visit) with Maynor Wilson MD   Medication Sig Dispense Refill    ALPRAZolam (XANAX) 0.25 MG tablet Take 0.25 mg by mouth nightly as needed for Anxiety.      diclofenac sodium (VOLTAREN) 1 % Gel Apply 2 g topically 3 (three) times daily as needed  (pain). 100 g 5    folic acid (FOLVITE) 1 MG tablet Take 1 tablet (1,000 mcg total) by mouth once daily. 90 tablet 3    hydroxychloroquine (PLAQUENIL) 200 mg tablet Take 1 tablet (200 mg total) by mouth 2 (two) times daily. 180 tablet 3    methotrexate 2.5 MG Tab Take 8 tablets (20 mg total) by mouth every 7 days. Every Thursday take 4 tab at lunch and 4 tab at supper 100 tablet 3    omega-3 fatty acids/fish oil (FISH OIL-OMEGA-3 FATTY ACIDS) 300-1,000 mg capsule Take 1 capsule by mouth once daily.      omeprazole (PRILOSEC) 20 MG capsule Take 20 mg by mouth once daily.      predniSONE (DELTASONE) 10 MG tablet Take 1 tablet by mouth once daily.      sertraline (ZOLOFT) 50 MG tablet Take 1 tablet by mouth once daily.      tamsulosin (FLOMAX) 0.4 mg Cap TAKE 1 CAPSULE DAILY 90 capsule 3    tirzepatide (MOUNJARO) 5 mg/0.5 mL PnIj Inject 5 mg into the skin every 7 days. 12 Pen 3    tiZANidine (ZANAFLEX) 4 MG tablet Take 1 tablet (4 mg total) by mouth nightly. 90 tablet 3    traMADoL (ULTRAM) 50 mg tablet Take 50 mg by mouth daily as needed.         Allergies  Review of patient's allergies indicates:  No Known Allergies    Physical Examination     Vitals:    12/10/24 0917   BP: 110/80   Pulse: 73   Resp: 18     Physical Exam  Vitals reviewed.   Constitutional:       Appearance: Normal appearance.   HENT:      Head: Normocephalic.      Right Ear: Tympanic membrane normal.      Left Ear: Tympanic membrane normal.      Nose: Nose normal.      Mouth/Throat:      Pharynx: Oropharynx is clear.   Eyes:      Extraocular Movements: Extraocular movements intact.      Pupils: Pupils are equal, round, and reactive to light.   Cardiovascular:      Rate and Rhythm: Normal rate and regular rhythm.      Pulses: Normal pulses.      Heart sounds: Normal heart sounds.   Pulmonary:      Effort: Pulmonary effort is normal.      Breath sounds: Normal breath sounds.   Abdominal:      General: Abdomen is flat. Bowel sounds are normal.       Palpations: Abdomen is soft.   Genitourinary:     Testes: Normal.      Prostate: Normal.      Rectum: Normal.   Musculoskeletal:         General: Normal range of motion.      Cervical back: Normal range of motion.   Skin:     General: Skin is warm and dry.   Neurological:      General: No focal deficit present.      Mental Status: He is alert and oriented to person, place, and time.   Psychiatric:         Mood and Affect: Mood normal.         Behavior: Behavior normal.          Assessment and Plan (including Health Maintenance)      Problem List  Smart Sets  Document Outside HM   :    Plan:    ICD-10-CM ICD-9-CM   1. Wellness examination  Z00.00 V70.0   2. Seropositive rheumatoid arthritis  M05.9 714.0       Problem List Items Addressed This Visit          Immunology/Multi System    Seropositive rheumatoid arthritis (Chronic)     Stable on treatment continue medication            Other    Wellness examination - Primary     Discussed results of laboratory examination   PSA low normal   He is having a urologic procedure next week with his urologist.                   Health Maintenance Due   Topic Date Due    HIV Screening  Never done       Problem List Items Addressed This Visit          Immunology/Multi System    Seropositive rheumatoid arthritis (Chronic)    Current Assessment & Plan     Stable on treatment continue medication            Other    Wellness examination - Primary    Current Assessment & Plan     Discussed results of laboratory examination   PSA low normal   He is having a urologic procedure next week with his urologist.            Health Maintenance Topics with due status: Not Due       Topic Last Completion Date    Colorectal Cancer Screening 07/07/2017    TETANUS VACCINE 09/19/2022    Hemoglobin A1c (Diabetic Prevention Screening) 10/31/2022    Lipid Panel 11/27/2024    RSV Vaccine (Age 60+ and Pregnant patients) Not Due       No future appointments.         Signature:  Maynor Marie,  MD [facility]  [address] [facility]  [place] [id number]    Date of encounter: 12/10/24

## 2024-12-30 ENCOUNTER — TELEPHONE (OUTPATIENT)
Dept: INTERNAL MEDICINE | Facility: CLINIC | Age: 59
End: 2024-12-30
Payer: COMMERCIAL

## 2024-12-30 DIAGNOSIS — E66.09 OBESITY DUE TO EXCESS CALORIES IN PEDIATRIC PATIENT, UNSPECIFIED BMI, UNSPECIFIED WHETHER SERIOUS COMORBIDITY PRESENT: Primary | ICD-10-CM

## 2024-12-30 RX ORDER — TIRZEPATIDE 2.5 MG/.5ML
2.5 INJECTION, SOLUTION SUBCUTANEOUS
Qty: 6 ML | Refills: 0 | Status: SHIPPED | OUTPATIENT
Start: 2024-12-30

## 2025-01-06 ENCOUNTER — TELEPHONE (OUTPATIENT)
Dept: INTERNAL MEDICINE | Facility: CLINIC | Age: 60
End: 2025-01-06
Payer: COMMERCIAL

## 2025-01-06 DIAGNOSIS — E66.09 OBESITY DUE TO EXCESS CALORIES IN PEDIATRIC PATIENT, UNSPECIFIED BMI, UNSPECIFIED WHETHER SERIOUS COMORBIDITY PRESENT: Primary | ICD-10-CM

## 2025-01-06 RX ORDER — TIRZEPATIDE 5 MG/.5ML
5 INJECTION, SOLUTION SUBCUTANEOUS
Qty: 6 ML | Refills: 2 | Status: SHIPPED | OUTPATIENT
Start: 2025-01-06 | End: 2025-01-09

## 2025-01-06 NOTE — TELEPHONE ENCOUNTER
----- Message from Kyanti sent at 1/6/2025  2:19 PM CST -----  Who Called: Truman Langleyan    Patient is returning phone call    Who Left Message for Patient:Dacia   Does the patient know what this is regarding?:pt would like to start taking Moujaro 5mg, tirzepatide      Preferred Method of Contact: Phone Call  Patient's Preferred Phone Number on File: [phone]   Best Call Back Number, if different:  Additional Information:

## 2025-01-06 NOTE — TELEPHONE ENCOUNTER
----- Message from [employer] sent at 1/6/2025 10:47 AM CST -----  .Type:  Patient Returning Call    Who Called:pt  Who Left Message for Patient:pt  Does the patient know what this is regarding?:tirzepatide, weight loss, (ZEPBOUND) 2.5 mg/0.5 mL PnIj  Would the patient rather a call back or a response via [facility]?   Best Call Back Number:[phone]  Additional Information: Please call back about insurance not covering tirzepatide, weight loss, (ZEPBOUND) 2.5 mg/0.5 mL PnIj would like to go back to the other medication insurance will cover

## 2025-01-06 NOTE — TELEPHONE ENCOUNTER
----- Message from Kathia sent at 1/6/2025 11:52 AM CST -----  Regarding: return call  Who Called: Truman Langleyan    Patient is returning phone call    Who Left Message for Patient:office staff  Does the patient know what this is regarding?:meds      Preferred Method of Contact: Phone Call  Patient's Preferred Phone Number on File: [phone]   Best Call Back Number, if different:  Additional Information: pt states he's returning a missed call

## 2025-01-06 NOTE — TELEPHONE ENCOUNTER
----- Message from Kathia sent at 1/6/2025 11:52 AM CST -----  Regarding: return call  Who Called: Truman Langleyan    Patient is returning phone call    Who Left Message for Patient:office staff  Does the patient know what this is regarding?:meds      Preferred Method of Contact: Phone Call  Patient's Preferred Phone Number on File: [phone]   Best Call Back Number, if different:  Additional Information: pt states he's returning a missed call

## 2025-01-06 NOTE — TELEPHONE ENCOUNTER
Please call back about insurance not covering tirzepatide, weight loss, (ZEPBOUND) 2.5 mg/0.5 mL Pierre would like to go back to the other medication insurance will cover, please advise

## 2025-01-09 ENCOUNTER — TELEPHONE (OUTPATIENT)
Dept: INTERNAL MEDICINE | Facility: CLINIC | Age: 60
End: 2025-01-09
Payer: COMMERCIAL

## 2025-01-09 DIAGNOSIS — E66.09 OBESITY DUE TO EXCESS CALORIES IN PEDIATRIC PATIENT, UNSPECIFIED BMI, UNSPECIFIED WHETHER SERIOUS COMORBIDITY PRESENT: Primary | ICD-10-CM

## 2025-01-09 RX ORDER — TIRZEPATIDE 5 MG/.5ML
5 INJECTION, SOLUTION SUBCUTANEOUS
Qty: 6 ML | Refills: 0 | Status: SHIPPED | OUTPATIENT
Start: 2025-01-09

## 2025-01-09 NOTE — TELEPHONE ENCOUNTER
----- Message from Jon sent at 1/9/2025  2:03 PM CST -----  Who Called: saranya -wife   Caller is requesting assistance/information from provider's office.    Symptoms (please be specific):     How long has patient had these symptoms:     List of preferred pharmacies on file (remove unneeded): [unfilled]  If different, enter pharmacy into here including location and phone number:    [facility] - [address]        Preferred Method of Contact: Phone Call  Patient's Preferred Phone Number on File: [phone]   Best Call Back Number, if different:  Additional Information:   tirzepatide (MOUNJARO) 5 mg/0.5 mL PnIj- needs an approval/ 3m supply

## 2025-01-10 ENCOUNTER — TELEPHONE (OUTPATIENT)
Dept: INTERNAL MEDICINE | Facility: CLINIC | Age: 60
End: 2025-01-10
Payer: COMMERCIAL

## 2025-01-10 NOTE — TELEPHONE ENCOUNTER
----- Message from [employer] sent at 1/10/2025 11:25 AM CST -----  .Type:  Patient Returning Call    Who Called:Cassia  Who Left Message for Patient:wife  Does the patient know what this is regarding?:Please call back missed call   Would the patient rather a call back or a response via [facility]?   Best Call Back Number:[phone]  Additional Information: Please call back missed call

## 2025-01-13 LAB
ALBUMIN/GLOB SERPL: 2.6 {RATIO}
ALBUMIN: 4.6
ALP ISOS SERPL LEV INH-CCNC: 127 U/L
ALT: 62
AST: 39
BILIRUB SERPL-MCNC: 0.7 MG/DL (ref 0.1–1.4)
BILIRUBIN DIRECT+TOT PNL SERPL-MCNC: 0.2 MG/DL (ref 0.01–0.4)
BUN SERPL-MCNC: 19 MG/DL
CALCIUM SERPL-MCNC: 10.4 MG/DL
CHLORIDE SERPL-SCNC: 100 MMOL/L (ref 99–108)
CK-BB: 100
CO2 SERPL-SCNC: 28 MMOL/L
CREAT SERPL-MCNC: 0.7 MG/DL
EGFR AFRICAN AMERICAN EXT: 154
EGFR: 127.3
GLOBULIN SER-MCNC: 1.8 G/DL
GLUCOSE SERPL-MCNC: 83 MG/DL
PHOSPHATE FLD-MCNC: 4.6 MG/DL (ref 2.5–4.9)
POTASSIUM SERPL-SCNC: 4.8 MMOL/L (ref 3.4–5.3)
PROTEIN TOTAL: 6.4
SODIUM BLD-SCNC: 137 MMOL/L (ref 137–147)
URATE SERPL-MCNC: 5.2 MG/DL

## 2025-01-20 LAB — CRC RECOMMENDATION EXT: NORMAL

## 2025-01-24 ENCOUNTER — PATIENT OUTREACH (OUTPATIENT)
Facility: CLINIC | Age: 60
End: 2025-01-24
Payer: COMMERCIAL

## 2025-01-24 NOTE — PROGRESS NOTES
Health Maintenance Topic(s) Outreach Outcomes & Actions Taken:    Colorectal Cancer Screening - Outreach Outcomes & Actions Taken  : External Records Uploaded, Care Team Updated, & History Updated if Applicable       Additional Notes:  Colonoscopy 1/20/25

## 2025-03-28 ENCOUNTER — PATIENT OUTREACH (OUTPATIENT)
Dept: ADMINISTRATIVE | Facility: HOSPITAL | Age: 60
End: 2025-03-28
Payer: COMMERCIAL

## 2025-04-24 LAB
ALBUMIN/GLOB SERPL: 2.3 {RATIO}
ALBUMIN: 4.5
ALP ISOS SERPL LEV INH-CCNC: 113 U/L
ALT SERPL-CCNC: 49 UNIT/L
AST: 49
BILIRUB SERPL-MCNC: 0.3 MG/DL (ref 0.1–1.4)
BILIRUBIN DIRECT+TOT PNL SERPL-MCNC: 0.1 MG/DL (ref 0.01–0.4)
BUN SERPL-MCNC: 23 MG/DL
BUN/CREAT SERPL: NORMAL
CALCIUM SERPL-MCNC: 9.2 MG/DL
CHLORIDE SERPL-SCNC: 103 MMOL/L (ref 99–108)
CK-BB: 145
CO2 SERPL-SCNC: 25 MMOL/L
CREAT SERPL-MCNC: 0.7 MG/DL
EGFR: 125.2
GLOBULIN SER-MCNC: 2 G/DL
GLUCOSE SERPL-MCNC: 103 MG/DL
PHOSPHATE FLD-MCNC: 4.4 MG/DL (ref 2.5–4.9)
POTASSIUM SERPL-SCNC: 4.5 MMOL/L (ref 3.4–5.3)
PROTEIN TOTAL: 6.5
SODIUM BLD-SCNC: 139 MMOL/L (ref 137–147)
URATE SERPL-MCNC: 4.9 MG/DL

## 2025-05-22 ENCOUNTER — PATIENT OUTREACH (OUTPATIENT)
Dept: ADMINISTRATIVE | Facility: HOSPITAL | Age: 60
End: 2025-05-22
Payer: COMMERCIAL

## 2025-05-27 ENCOUNTER — OFFICE VISIT (OUTPATIENT)
Dept: INTERNAL MEDICINE | Facility: CLINIC | Age: 60
End: 2025-05-27
Payer: COMMERCIAL

## 2025-05-27 VITALS
HEART RATE: 60 BPM | WEIGHT: 208 LBS | DIASTOLIC BLOOD PRESSURE: 78 MMHG | OXYGEN SATURATION: 98 % | HEIGHT: 70 IN | SYSTOLIC BLOOD PRESSURE: 126 MMHG | BODY MASS INDEX: 29.78 KG/M2

## 2025-05-27 DIAGNOSIS — N40.0 PROSTATE HYPERTROPHY: Chronic | ICD-10-CM

## 2025-05-27 DIAGNOSIS — E66.3 OVERWEIGHT WITH BODY MASS INDEX (BMI) OF 29 TO 29.9 IN ADULT: Chronic | ICD-10-CM

## 2025-05-27 DIAGNOSIS — K21.9 GASTROESOPHAGEAL REFLUX DISEASE, UNSPECIFIED WHETHER ESOPHAGITIS PRESENT: Chronic | ICD-10-CM

## 2025-05-27 DIAGNOSIS — F41.1 ANXIETY STATE: Chronic | ICD-10-CM

## 2025-05-27 DIAGNOSIS — M05.9 SEROPOSITIVE RHEUMATOID ARTHRITIS: Chronic | ICD-10-CM

## 2025-05-27 DIAGNOSIS — Z01.818 PREOP EXAM FOR INTERNAL MEDICINE: Primary | ICD-10-CM

## 2025-05-27 PROCEDURE — 3008F BODY MASS INDEX DOCD: CPT | Mod: CPTII,,,

## 2025-05-27 PROCEDURE — 3078F DIAST BP <80 MM HG: CPT | Mod: CPTII,,,

## 2025-05-27 PROCEDURE — 1159F MED LIST DOCD IN RCRD: CPT | Mod: CPTII,,,

## 2025-05-27 PROCEDURE — 3074F SYST BP LT 130 MM HG: CPT | Mod: CPTII,,,

## 2025-05-27 PROCEDURE — 99214 OFFICE O/P EST MOD 30 MIN: CPT | Mod: ,,,

## 2025-05-27 PROCEDURE — 1160F RVW MEDS BY RX/DR IN RCRD: CPT | Mod: CPTII,,,

## 2025-05-27 NOTE — ASSESSMENT & PLAN NOTE
-stable and established with Rheumatology   -currently on methotrexate 2.5 mg and folic acid 1 mg, continue   -also on hydroxychloroquine 200 mg b.i.d., continue    -no need to hold hydroxychloroquine or methotrexate for upcoming total joint replacement

## 2025-05-27 NOTE — ASSESSMENT & PLAN NOTE
-Patient feeling generally well today  -Vital Signs stable  -Most recent labs reviewed and generally stable  -EKG :  Results requested  -Chest Xray:  Results requested  -Able to perform ADLs and IADLs independently  -Mets functional score:  >4    -DOAC: This patient does not have an active medication from one of the medication groupers..   -Antiplatelet:This patient does not have an active medication from one of the medication groupers.    -Cardiac Clearance necessary:  Pending decision and review of EKG and chest x-ray   -Cleared from Internal Medicine standpoint for  Pending review of EKG and chest x-ray results

## 2025-05-27 NOTE — ASSESSMENT & PLAN NOTE
"Estimated body mass index is 29.84 kg/m² as calculated from the following:    Height as of this encounter: 5' 10" (1.778 m).    Weight as of this encounter: 94.3 kg (208 lb).    - Encourage exorcise for 30-45 min a day, 5x a week   - Log foods and track calories    - Eat lean meats (chicken, turkey, fish, lean ground beef)   - Avoid fried, fatty or processed foods.   - Avoid excessive carbohydrate rich foods (pasta, bread, rice, potatoes)    "

## 2025-05-27 NOTE — PROGRESS NOTES
Patient ID: Truman Gonzalez is a 59 y.o. male.    Chief Complaint: Pre-op Exam (Pre op exam- Total knee replacement scheduled 6/17/25. )    Truman Gonzalez is a 59 y.o. male, known to Dr Wilson, with medical comorbidities including seropositive rheumatoid arthritis, anxiety, BPH, and GERD.    Patient presents today for upcoming left total knee arthroplasty scheduled upcoming June.  Reports has been fairly well without major injury or illness.  Is currently established with Rheumatology on methotrexate and hydroxychloroquine for seropositive rheumatoid arthritis and currently well managed.  Does have some ongoing urinary frequency with BPH currently on tamsulosin and established with Urology.  Vital signs stable for visit.  No fevers, chills, or recent infections.  Denies chest pain, palpitations, or shortness a breath.    Wellness: 12/10/2024    Pre-Operative Evaluation:    Risk/Complexity of Surgery:   []  High risk (> 5% MI risk): cardiac and aortic and peripheral vascular surgery   [x]  Intermediate risk (1-5% MI risk): head and neck (including CEA), intraperitoneal, intrathoracic, orthopedic, prostate   []  Low risk (1% MI risk): endoscopic procedures, cataract surgery, breast surgery     Current Medical Problems: Is EKG needed?   [] Active CVD (Unstable Angina, Class III or IV Angina, recent MI in past 30 days, decompensated heart failure, SVT>100, High Grade AV block, mobitz type 2 AV block, symptomatic bradycardia or VT, severe aortic stenosis, symptomatic mitral stenosis)  [] Creatinine > 2.0   [] DM on insulin   [x] QT prolonging drugs (I.e - antiarrythmics, antipsychotics/SSRIs, flouroquinolones)  [] Hx of abnormal EKG   [] HTN   [] No active CVD, creatinine > 2.0, DM on insulin - therefore no ECG required.     Functional Status:  < 4 METs (can do light housework (dishwashing), can walk two blocks on level ground, cannot climb a flight of stairs, walk up a hill, or run)   []  Low risk  procedure: no testing required   [x]  Intermediate/high risk procedure: EKG and stress testing   > 4 METs (can rake leaves, weeding or pushing a power mower, walking up two flights of stairs)  [] Low/intermediate risk procedure: no testing required   [] High risk procedure: EKG required     Pregnancy: [x]  No     []  Yes  History of anesthesia problems: [x]  No   []  Yes  Smoking status: [x]  Non-smoker   []  Smoker  Social support:  [x]  Yes   []  No    Revised Cardiac Risk Index:   []  High risk surgery   []  History of CVA   []  CHF   []  Creatinine > 2.0   []  DM on insulin   []  Ischemic heart disease   []  Suprainguinal vascular, intrathoracic, or intra-abdominal surgical site     Total Cardiac Risk Index Score:   [x]  0 - 0.4% risk perioperative cardiac event   []  1 - 0.9% risk perioperative cardiac event   []  2 - 6.6% risk perioperative cardiac event   []  3 - 11% risk perioperative cardiac event, perioperative beta blocker for four weeks prior to surgery would be beneficial   []  4 - 11% risk perioperative cardiac event, perioperative beta blocker for four weeks prior to surgery would be beneficial   []  5 - 11% risk perioperative cardiac event, perioperative beta blocker for four weeks prior to surgery would be beneficial     Special Considerations:   [x]  Joint or Heart Valve Replacement: urinalysis needed.   []  Rheumatoid Arthritis history:  cervical spine series for atlantoaxial subluxation needed.   []  AUTUMN: patient is at risk for AUTUMN and should be screened prior to procedure; patient is compliant with CPAP and should continue use while asleep in hospital.  [] Balloon Angioplasty: Delay surgery 2-4 weeks post angioplasty + continue ASA perioperatively if possible  [] Bare Metal Stent: Delay surgery 4-6 weeks post procedure and resume ASA perioperatively if possible  [] Drug Eluting Stent: Delay surgery one year post procedure and continue ASA perioperatively      MEDICAL HISTORY:    Past Medical  "History:   Diagnosis Date    Anxiety state 01/20/2023    GERD (gastroesophageal reflux disease) 05/27/2025      Past Surgical History:   Procedure Laterality Date    SPINE SURGERY  2018      Social History[1]       Health Maintenance Due   Topic Date Due    HIV Screening  Never done    Pneumococcal Vaccines (Age 50+) (1 of 2 - PCV) Never done          Patient Care Team:  Maynor Wilson MD as PCP - General (Internal Medicine)  She Mendoza MD as Consulting Physician (Rheumatology)      Review of Systems   Constitutional:  Negative for fatigue and fever.   HENT:  Negative for congestion, rhinorrhea, sore throat and trouble swallowing.    Eyes:  Negative for redness and visual disturbance.   Respiratory:  Negative for cough, chest tightness and shortness of breath.    Cardiovascular:  Negative for chest pain and palpitations.   Gastrointestinal:  Negative for abdominal pain, constipation, diarrhea, nausea and vomiting.   Genitourinary:  Negative for dysuria, flank pain, frequency and urgency.   Musculoskeletal:  Negative for arthralgias, gait problem and myalgias.   Skin:  Negative for rash and wound.   Neurological:  Negative for facial asymmetry, speech difficulty, weakness and headaches.   All other systems reviewed and are negative.      Objective:   /78 (BP Location: Left arm, Patient Position: Sitting)   Pulse 60   Ht 5' 10" (1.778 m)   Wt 94.3 kg (208 lb)   SpO2 98%   BMI 29.84 kg/m²      Physical Exam  Constitutional:       General: He is not in acute distress.     Appearance: Normal appearance.   HENT:      Right Ear: Tympanic membrane, ear canal and external ear normal.      Left Ear: Tympanic membrane, ear canal and external ear normal.      Nose: Nose normal.      Mouth/Throat:      Mouth: Mucous membranes are moist.      Pharynx: Oropharynx is clear.   Eyes:      Extraocular Movements: Extraocular movements intact.      Conjunctiva/sclera: Conjunctivae normal.      Pupils: Pupils are " equal, round, and reactive to light.   Cardiovascular:      Rate and Rhythm: Normal rate and regular rhythm.      Pulses: Normal pulses.      Heart sounds: Normal heart sounds. No murmur heard.     No gallop.   Pulmonary:      Effort: Pulmonary effort is normal.      Breath sounds: Normal breath sounds. No wheezing.   Abdominal:      General: Bowel sounds are normal. There is no distension.      Palpations: Abdomen is soft. There is no mass.      Tenderness: There is no abdominal tenderness. There is no guarding.   Musculoskeletal:         General: Normal range of motion.   Skin:     General: Skin is warm and dry.   Neurological:      Mental Status: He is alert. Mental status is at baseline.      Sensory: No sensory deficit.      Motor: No weakness.           Assessment:       ICD-10-CM ICD-9-CM   1. Preop exam for internal medicine  Z01.818 V72.83   2. Seropositive rheumatoid arthritis  M05.9 714.0   3. Prostate hypertrophy  N40.0 600.90   4. Anxiety state  F41.1 300.00   5. Overweight with body mass index (BMI) of 29 to 29.9 in adult  E66.3 278.02    Z68.29 V85.25   6. Gastroesophageal reflux disease, unspecified whether esophagitis present  K21.9 530.81        Plan:   1. Preop exam for internal medicine  Assessment & Plan:  -Patient feeling generally well today  -Vital Signs stable  -Most recent labs reviewed and generally stable  -EKG :  Results requested  -Chest Xray:  Results requested  -Able to perform ADLs and IADLs independently  -Mets functional score:  >4    -DOAC: This patient does not have an active medication from one of the medication groupers..   -Antiplatelet:This patient does not have an active medication from one of the medication groupers.    -Cardiac Clearance necessary:  Pending decision and review of EKG and chest x-ray   -Cleared from Internal Medicine standpoint for  Pending review of EKG and chest x-ray results      2. Seropositive rheumatoid arthritis  Assessment & Plan:  -stable and  "established with Rheumatology   -currently on methotrexate 2.5 mg and folic acid 1 mg, continue   -also on hydroxychloroquine 200 mg b.i.d., continue    -no need to hold hydroxychloroquine or methotrexate for upcoming total joint replacement      3. Prostate hypertrophy  Assessment & Plan:  -established with Urology   -currently on tamsulosin 0.4 mg, continue   -previously recommended UroLift by Urology, encouraged to contemplate further      4. Anxiety state  Assessment & Plan:  -Stable    -Currently on Zoloft 50 mg q.d., continue   -also on alprazolam 0.25 mg nightly p.r.n., continue  -Do not abruptly stop medication  -Notify clinic for new or worsening symptoms        5. Overweight with body mass index (BMI) of 29 to 29.9 in adult  Assessment & Plan:  Estimated body mass index is 29.84 kg/m² as calculated from the following:    Height as of this encounter: 5' 10" (1.778 m).    Weight as of this encounter: 94.3 kg (208 lb).    - Encourage exorcise for 30-45 min a day, 5x a week   - Log foods and track calories    - Eat lean meats (chicken, turkey, fish, lean ground beef)   - Avoid fried, fatty or processed foods.   - Avoid excessive carbohydrate rich foods (pasta, bread, rice, potatoes)        6. Gastroesophageal reflux disease, unspecified whether esophagitis present  Assessment & Plan:  -stable   -currently on omeprazole 20 mg daily, continue  -avoid food triggers (acidic, spicy, greasy, etc.)  -last meal of the day 2-3 hours before bedtime  -avoid large meals in one sitting, encourage small frequent meals              Follow up for Previously scheduled and PRN if need.   -plan specifics discussed above          Medication List with Changes/Refills   Current Medications    ALPRAZOLAM (XANAX) 0.25 MG TABLET    Take 0.25 mg by mouth nightly as needed for Anxiety.    DICLOFENAC SODIUM (VOLTAREN) 1 % GEL    Apply 2 g topically 3 (three) times daily as needed (pain).    FOLIC ACID (FOLVITE) 1 MG TABLET    Take 1 " tablet (1,000 mcg total) by mouth once daily.    HYDROXYCHLOROQUINE (PLAQUENIL) 200 MG TABLET    Take 1 tablet (200 mg total) by mouth 2 (two) times daily.    METHOTREXATE 2.5 MG TAB    Take 8 tablets (20 mg total) by mouth every 7 days. Every Thursday take 4 tab at lunch and 4 tab at supper    OMEGA-3 FATTY ACIDS/FISH OIL (FISH OIL-OMEGA-3 FATTY ACIDS) 300-1,000 MG CAPSULE    Take 1 capsule by mouth once daily.    OMEPRAZOLE (PRILOSEC) 20 MG CAPSULE    Take 20 mg by mouth once daily.    PREDNISONE (DELTASONE) 10 MG TABLET    Take 1 tablet by mouth once daily.    SERTRALINE (ZOLOFT) 50 MG TABLET    Take 1 tablet by mouth once daily.    TAMSULOSIN (FLOMAX) 0.4 MG CAP    TAKE 1 CAPSULE DAILY    TIZANIDINE (ZANAFLEX) 4 MG TABLET    Take 1 tablet (4 mg total) by mouth nightly.    TRAMADOL (ULTRAM) 50 MG TABLET    Take 50 mg by mouth daily as needed.   Discontinued Medications    TIRZEPATIDE, WEIGHT LOSS, (ZEPBOUND) 5 MG/0.5 ML PNIJ    Inject 5 mg into the skin every 7 days.             [1]   Social History  Tobacco Use    Smoking status: Never     Passive exposure: Never    Smokeless tobacco: Never   Substance Use Topics    Alcohol use: Yes     Alcohol/week: 10.0 standard drinks of alcohol     Types: 10 Cans of beer per week     Comment: weekends    Drug use: Never

## 2025-05-27 NOTE — ASSESSMENT & PLAN NOTE
-stable   -currently on omeprazole 20 mg daily, continue  -avoid food triggers (acidic, spicy, greasy, etc.)  -last meal of the day 2-3 hours before bedtime  -avoid large meals in one sitting, encourage small frequent meals

## 2025-05-27 NOTE — ASSESSMENT & PLAN NOTE
-Stable    -Currently on Zoloft 50 mg q.d., continue   -also on alprazolam 0.25 mg nightly p.r.n., continue  -Do not abruptly stop medication  -Notify clinic for new or worsening symptoms

## 2025-05-27 NOTE — Clinical Note
Patient was wanting to know when he was officially cleared.  Please let him know received EKG and x-ray, clearance being sent to his surgeon.  No need to hold methotrexate or hydroxychloroquine for procedure, may continue to take

## 2025-05-27 NOTE — LETTER
May 27, 2025      TATY Enrique  [facility]  [address] [place] [address]  [place] [address]  Phone: [phone]  Fax: [phone]       Patient: Truman Gonzalez   YOB: 1965  Date of Visit: 05/27/2025    Dear    Truman Carlos  was at [facility] on 05/27/2025. The patient is cleared for their procedure  with no restrictions.  Continue methotrexate and hydroxychloroquine, no need to stop for joint replacement procedure.  If you have any questions or concerns, or if I can be of further assistance, please do not hesitate to contact me.    Sincerely,    TATY Enrique

## 2025-05-27 NOTE — ASSESSMENT & PLAN NOTE
-established with Urology   -currently on tamsulosin 0.4 mg, continue   -previously recommended UroLift by Urology, encouraged to contemplate further

## 2025-05-28 NOTE — PROGRESS NOTES
Chest x-ray essentially normal with no acute cardiopulmonary process   EKG normal sinus rhythm with sinus arrhythmia and partial right bundle miguel block    -patient is cleared for upcoming procedure from primary care standpoint

## 2025-05-28 NOTE — PROGRESS NOTES
Spoke with patient and let him know information was reviewed and faxed to surgeon, he was given recommendations about medications.

## 2025-06-09 ENCOUNTER — TELEPHONE (OUTPATIENT)
Dept: INTERNAL MEDICINE | Facility: CLINIC | Age: 60
End: 2025-06-09
Payer: COMMERCIAL

## 2025-06-09 NOTE — TELEPHONE ENCOUNTER
Spoke with patient. Let him know there was no need to stop medications they discussed before surgery, methotrexate and hydroxycloriquine. Verbalized understanding.

## 2025-06-09 NOTE — TELEPHONE ENCOUNTER
Copied from CRM #[id number]. Topic: General Inquiry - Patient Advice  >> Jun 9, 2025  3:40 PM Jackie wrote:  Who Called: Truman Gonzalez    Caller is requesting assistance/information from provider's office.    Symptoms (please be specific): n/a   How long has patient had these symptoms:  n/a  List of preferred pharmacies on file (remove unneeded): n/a      Preferred Method of Contact: Phone Call  Patient's Preferred Phone Number on File: [phone]   Best Call Back Number, if different:  Additional Information: Pt stated that he was informed to stop 2 medications before his surgery and would like to know what two medications he should stop. He was seen on 5/27/25 for pre-op. Please advise.